# Patient Record
Sex: FEMALE | Race: WHITE | NOT HISPANIC OR LATINO | Employment: OTHER | URBAN - METROPOLITAN AREA
[De-identification: names, ages, dates, MRNs, and addresses within clinical notes are randomized per-mention and may not be internally consistent; named-entity substitution may affect disease eponyms.]

---

## 2022-08-29 ASSESSMENT — PATIENT HEALTH QUESTIONNAIRE - PHQ9
2. FEELING DOWN, DEPRESSED OR HOPELESS: MORE THAN HALF THE DAYS
10. IF YOU CHECKED OFF ANY PROBLEMS, HOW DIFFICULT HAVE THESE PROBLEMS MADE IT FOR YOU TO DO YOUR WORK, TAKE CARE OF THINGS AT HOME, OR GET ALONG WITH OTHER PEOPLE: VERY DIFFICULT
3. TROUBLE FALLING OR STAYING ASLEEP: MORE THAN HALF THE DAYS
SUM OF ALL RESPONSES TO PHQ9 QUESTIONS 1 & 2: 4
6. FEELING BAD ABOUT YOURSELF - OR THAT YOU ARE A FAILURE OR HAVE LET YOURSELF OR YOUR FAMILY DOWN: NEARLY EVERY DAY
7. TROUBLE CONCENTRATING ON THINGS, SUCH AS READING THE NEWSPAPER OR WATCHING TELEVISION: NOT AT ALL
1. LITTLE INTEREST OR PLEASURE IN DOING THINGS: MORE THAN HALF THE DAYS
8. MOVING OR SPEAKING SO SLOWLY THAT OTHER PEOPLE COULD HAVE NOTICED. OR THE OPPOSITE, BEING SO FIGETY OR RESTLESS THAT YOU HAVE BEEN MOVING AROUND A LOT MORE THAN USUAL: NOT AT ALL
4. FEELING TIRED OR HAVING LITTLE ENERGY: MORE THAN HALF THE DAYS
5. POOR APPETITE OR OVEREATING: NEARLY EVERY DAY
SUM OF ALL RESPONSES TO PHQ QUESTIONS 1-9: 14
9. THOUGHTS THAT YOU WOULD BE BETTER OFF DEAD, OR OF HURTING YOURSELF: NOT AT ALL

## 2022-08-29 ASSESSMENT — ANXIETY QUESTIONNAIRES
6. BECOMING EASILY ANNOYED OR IRRITABLE: NEARLY EVERY DAY
IF YOU CHECKED OFF ANY PROBLEMS ON THIS QUESTIONNAIRE, HOW DIFFICULT HAVE THESE PROBLEMS MADE IT FOR YOU TO DO YOUR WORK, TAKE CARE OF THINGS AT HOME, OR GET ALONG WITH OTHER PEOPLE: VERY DIFFICULT
3. WORRYING TOO MUCH ABOUT DIFFERENT THINGS: NEARLY EVERY DAY
7. FEELING AFRAID AS IF SOMETHING AWFUL MIGHT HAPPEN: NEARLY EVERY DAY
GAD7 TOTAL SCORE: 19
2. NOT BEING ABLE TO STOP OR CONTROL WORRYING: NEARLY EVERY DAY
5. BEING SO RESTLESS THAT IT IS HARD TO SIT STILL: SEVERAL DAYS
4. TROUBLE RELAXING: NEARLY EVERY DAY
1. FEELING NERVOUS, ANXIOUS, OR ON EDGE: NEARLY EVERY DAY

## 2023-02-08 ASSESSMENT — PATIENT HEALTH QUESTIONNAIRE - PHQ9
2. FEELING DOWN, DEPRESSED OR HOPELESS: SEVERAL DAYS
10. IF YOU CHECKED OFF ANY PROBLEMS, HOW DIFFICULT HAVE THESE PROBLEMS MADE IT FOR YOU TO DO YOUR WORK, TAKE CARE OF THINGS AT HOME, OR GET ALONG WITH OTHER PEOPLE: SOMEWHAT DIFFICULT
SUM OF ALL RESPONSES TO PHQ QUESTIONS 1-9: 4
SUM OF ALL RESPONSES TO PHQ9 QUESTIONS 1 & 2: 2
6. FEELING BAD ABOUT YOURSELF - OR THAT YOU ARE A FAILURE OR HAVE LET YOURSELF OR YOUR FAMILY DOWN: SEVERAL DAYS
3. TROUBLE FALLING OR STAYING ASLEEP: SEVERAL DAYS
5. POOR APPETITE OR OVEREATING: NOT AT ALL
4. FEELING TIRED OR HAVING LITTLE ENERGY: NOT AT ALL
1. LITTLE INTEREST OR PLEASURE IN DOING THINGS: SEVERAL DAYS
9. THOUGHTS THAT YOU WOULD BE BETTER OFF DEAD, OR OF HURTING YOURSELF: NOT AT ALL
8. MOVING OR SPEAKING SO SLOWLY THAT OTHER PEOPLE COULD HAVE NOTICED. OR THE OPPOSITE, BEING SO FIGETY OR RESTLESS THAT YOU HAVE BEEN MOVING AROUND A LOT MORE THAN USUAL: NOT AT ALL
7. TROUBLE CONCENTRATING ON THINGS, SUCH AS READING THE NEWSPAPER OR WATCHING TELEVISION: NOT AT ALL

## 2023-02-08 ASSESSMENT — ANXIETY QUESTIONNAIRES
IF YOU CHECKED OFF ANY PROBLEMS ON THIS QUESTIONNAIRE, HOW DIFFICULT HAVE THESE PROBLEMS MADE IT FOR YOU TO DO YOUR WORK, TAKE CARE OF THINGS AT HOME, OR GET ALONG WITH OTHER PEOPLE: SOMEWHAT DIFFICULT
3. WORRYING TOO MUCH ABOUT DIFFERENT THINGS: SEVERAL DAYS
2. NOT BEING ABLE TO STOP OR CONTROL WORRYING: SEVERAL DAYS
7. FEELING AFRAID AS IF SOMETHING AWFUL MIGHT HAPPEN: SEVERAL DAYS
1. FEELING NERVOUS, ANXIOUS, OR ON EDGE: SEVERAL DAYS
6. BECOMING EASILY ANNOYED OR IRRITABLE: SEVERAL DAYS
4. TROUBLE RELAXING: SEVERAL DAYS
5. BEING SO RESTLESS THAT IT IS HARD TO SIT STILL: NOT AT ALL
GAD7 TOTAL SCORE: 6

## 2023-02-27 PROBLEM — M89.9 SCAPULAR DYSFUNCTION: Status: ACTIVE | Noted: 2023-02-27

## 2023-02-27 PROBLEM — E55.9 VITAMIN D DEFICIENCY: Status: ACTIVE | Noted: 2023-02-27

## 2023-02-27 PROBLEM — G89.29 NECK PAIN, CHRONIC: Status: ACTIVE | Noted: 2023-02-27

## 2023-02-27 PROBLEM — M18.12 PRIMARY OSTEOARTHRITIS OF FIRST CARPOMETACARPAL JOINT OF LEFT HAND: Status: ACTIVE | Noted: 2023-02-27

## 2023-02-27 PROBLEM — G43.109 MIGRAINOUS VERTIGO: Status: ACTIVE | Noted: 2023-02-27

## 2023-02-27 PROBLEM — H90.41 SENSORINEURAL HEARING LOSS (SNHL) OF RIGHT EAR WITH UNRESTRICTED HEARING OF LEFT EAR: Status: ACTIVE | Noted: 2023-02-27

## 2023-02-27 PROBLEM — R51.9 LEFT-SIDED HEADACHE: Status: ACTIVE | Noted: 2023-02-27

## 2023-02-27 PROBLEM — G56.01 CARPAL TUNNEL SYNDROME OF RIGHT WRIST: Status: ACTIVE | Noted: 2023-02-27

## 2023-02-27 PROBLEM — E03.9 HYPOTHYROIDISM: Status: ACTIVE | Noted: 2023-02-27

## 2023-02-27 PROBLEM — L98.9 LESION OF SKIN OF FACE: Status: ACTIVE | Noted: 2023-02-27

## 2023-02-27 PROBLEM — E87.6 HYPOKALEMIA: Status: ACTIVE | Noted: 2023-02-27

## 2023-02-27 PROBLEM — K44.9 HERNIA, HIATAL: Status: ACTIVE | Noted: 2023-02-27

## 2023-02-27 PROBLEM — M54.50 LUMBAR PAIN: Status: ACTIVE | Noted: 2023-02-27

## 2023-02-27 PROBLEM — F41.1 GENERALIZED ANXIETY DISORDER: Status: ACTIVE | Noted: 2023-02-27

## 2023-02-27 PROBLEM — R19.7 DIARRHEA IN ADULT PATIENT: Status: ACTIVE | Noted: 2023-02-27

## 2023-02-27 PROBLEM — F07.81 CONCUSSION SYNDROME: Status: ACTIVE | Noted: 2023-02-27

## 2023-02-27 PROBLEM — M25.562 KNEE PAIN, LEFT: Status: ACTIVE | Noted: 2023-02-27

## 2023-02-27 PROBLEM — M79.642 BILATERAL HAND PAIN: Status: ACTIVE | Noted: 2023-02-27

## 2023-02-27 PROBLEM — E88.810 INSULIN RESISTANCE SYNDROME: Status: ACTIVE | Noted: 2023-02-27

## 2023-02-27 PROBLEM — G43.909 MIGRAINE HEADACHE: Status: ACTIVE | Noted: 2023-02-27

## 2023-02-27 PROBLEM — E67.3 HIGH VITAMIN D LEVEL: Status: ACTIVE | Noted: 2023-02-27

## 2023-02-27 PROBLEM — M79.641 BILATERAL HAND PAIN: Status: ACTIVE | Noted: 2023-02-27

## 2023-02-27 PROBLEM — S99.922A INJURY OF LEFT TOE: Status: ACTIVE | Noted: 2023-02-27

## 2023-02-27 PROBLEM — M54.2 NECK PAIN, CHRONIC: Status: ACTIVE | Noted: 2023-02-27

## 2023-02-27 PROBLEM — H93.13 TINNITUS OF BOTH EARS: Status: ACTIVE | Noted: 2023-02-27

## 2023-02-27 PROBLEM — G47.33 OSA (OBSTRUCTIVE SLEEP APNEA): Status: ACTIVE | Noted: 2023-02-27

## 2023-02-27 PROBLEM — E78.5 HYPERLIPIDEMIA: Status: ACTIVE | Noted: 2023-02-27

## 2023-02-27 PROBLEM — K59.09 CHRONIC CONSTIPATION: Status: ACTIVE | Noted: 2023-02-27

## 2023-02-27 PROBLEM — R42 VERTIGO: Status: ACTIVE | Noted: 2023-02-27

## 2023-02-27 PROBLEM — K21.9 GERD (GASTROESOPHAGEAL REFLUX DISEASE): Status: ACTIVE | Noted: 2023-02-27

## 2023-02-27 PROBLEM — E23.6 RATHKE'S CLEFT CYST (MULTI): Status: ACTIVE | Noted: 2023-02-27

## 2023-02-27 PROBLEM — R29.898 LEFT HAND WEAKNESS: Status: ACTIVE | Noted: 2023-02-27

## 2023-02-27 RX ORDER — ONDANSETRON 4 MG/1
8 TABLET, ORALLY DISINTEGRATING ORAL EVERY 8 HOURS PRN
COMMUNITY
Start: 2019-02-25

## 2023-02-27 RX ORDER — EZETIMIBE 10 MG/1
1 TABLET ORAL EVERY EVENING
COMMUNITY
Start: 2012-10-08 | End: 2023-10-16 | Stop reason: SDUPTHER

## 2023-02-27 RX ORDER — MECLIZINE HYDROCHLORIDE 25 MG/1
1 TABLET ORAL 3 TIMES DAILY PRN
COMMUNITY
Start: 2022-05-04

## 2023-02-27 RX ORDER — MOMETASONE FUROATE 50 UG/1
SPRAY, METERED NASAL
COMMUNITY
Start: 2021-11-11 | End: 2023-08-08 | Stop reason: ALTCHOICE

## 2023-02-27 RX ORDER — FAMOTIDINE 20 MG/1
20 TABLET, FILM COATED ORAL DAILY
COMMUNITY
End: 2023-08-08 | Stop reason: ALTCHOICE

## 2023-02-27 RX ORDER — HYDROCORTISONE 25 MG/G
CREAM TOPICAL
COMMUNITY
Start: 2019-12-03

## 2023-02-27 RX ORDER — IVERMECTIN 3 MG/1
5 TABLET ORAL DAILY PRN
COMMUNITY
End: 2024-02-13 | Stop reason: DRUGHIGH

## 2023-02-27 RX ORDER — FLUOXETINE 20 MG/1
2 TABLET ORAL DAILY
COMMUNITY
Start: 2022-09-06 | End: 2024-02-06 | Stop reason: SDUPTHER

## 2023-02-27 RX ORDER — SUMATRIPTAN 50 MG/1
50 TABLET, FILM COATED ORAL
COMMUNITY
Start: 2019-02-25

## 2023-02-27 RX ORDER — POLYETHYLENE GLYCOL 3350 17 G/17G
POWDER, FOR SOLUTION ORAL
COMMUNITY
End: 2023-08-08 | Stop reason: ALTCHOICE

## 2023-02-27 RX ORDER — LEVOTHYROXINE SODIUM 100 UG/1
1 TABLET ORAL DAILY
COMMUNITY
Start: 2011-12-21 | End: 2023-06-26 | Stop reason: SDUPTHER

## 2023-02-27 RX ORDER — POTASSIUM CHLORIDE 750 MG/1
1 TABLET, FILM COATED, EXTENDED RELEASE ORAL 3 TIMES DAILY
COMMUNITY
Start: 2016-05-31 | End: 2023-06-16 | Stop reason: SDUPTHER

## 2023-02-27 RX ORDER — ERGOCALCIFEROL 1.25 MG/1
1.25 CAPSULE ORAL
COMMUNITY
Start: 2016-07-14 | End: 2023-08-08 | Stop reason: SDUPTHER

## 2023-03-07 ENCOUNTER — SOCIAL WORK (OUTPATIENT)
Dept: PRIMARY CARE | Facility: CLINIC | Age: 63
End: 2023-03-07
Payer: COMMERCIAL

## 2023-03-09 ASSESSMENT — PATIENT HEALTH QUESTIONNAIRE - PHQ9
2. FEELING DOWN, DEPRESSED OR HOPELESS: SEVERAL DAYS
1. LITTLE INTEREST OR PLEASURE IN DOING THINGS: SEVERAL DAYS
SUM OF ALL RESPONSES TO PHQ9 QUESTIONS 1 & 2: 2
3. TROUBLE FALLING OR STAYING ASLEEP: SEVERAL DAYS
7. TROUBLE CONCENTRATING ON THINGS, SUCH AS READING THE NEWSPAPER OR WATCHING TELEVISION: NOT AT ALL
6. FEELING BAD ABOUT YOURSELF - OR THAT YOU ARE A FAILURE OR HAVE LET YOURSELF OR YOUR FAMILY DOWN: SEVERAL DAYS
4. FEELING TIRED OR HAVING LITTLE ENERGY: SEVERAL DAYS
SUM OF ALL RESPONSES TO PHQ QUESTIONS 1-9: 5
8. MOVING OR SPEAKING SO SLOWLY THAT OTHER PEOPLE COULD HAVE NOTICED. OR THE OPPOSITE, BEING SO FIGETY OR RESTLESS THAT YOU HAVE BEEN MOVING AROUND A LOT MORE THAN USUAL: NOT AT ALL
10. IF YOU CHECKED OFF ANY PROBLEMS, HOW DIFFICULT HAVE THESE PROBLEMS MADE IT FOR YOU TO DO YOUR WORK, TAKE CARE OF THINGS AT HOME, OR GET ALONG WITH OTHER PEOPLE: SOMEWHAT DIFFICULT
9. THOUGHTS THAT YOU WOULD BE BETTER OFF DEAD, OR OF HURTING YOURSELF: NOT AT ALL
5. POOR APPETITE OR OVEREATING: NOT AT ALL

## 2023-03-09 ASSESSMENT — ANXIETY QUESTIONNAIRES
2. NOT BEING ABLE TO STOP OR CONTROL WORRYING: SEVERAL DAYS
IF YOU CHECKED OFF ANY PROBLEMS ON THIS QUESTIONNAIRE, HOW DIFFICULT HAVE THESE PROBLEMS MADE IT FOR YOU TO DO YOUR WORK, TAKE CARE OF THINGS AT HOME, OR GET ALONG WITH OTHER PEOPLE: SOMEWHAT DIFFICULT
4. TROUBLE RELAXING: NOT AT ALL
3. WORRYING TOO MUCH ABOUT DIFFERENT THINGS: SEVERAL DAYS
7. FEELING AFRAID AS IF SOMETHING AWFUL MIGHT HAPPEN: NOT AT ALL
5. BEING SO RESTLESS THAT IT IS HARD TO SIT STILL: NOT AT ALL
6. BECOMING EASILY ANNOYED OR IRRITABLE: SEVERAL DAYS
GAD7 TOTAL SCORE: 3
1. FEELING NERVOUS, ANXIOUS, OR ON EDGE: NOT AT ALL

## 2023-03-29 ENCOUNTER — DOCUMENTATION (OUTPATIENT)
Dept: PRIMARY CARE | Facility: CLINIC | Age: 63
End: 2023-03-29
Payer: COMMERCIAL

## 2023-03-29 DIAGNOSIS — F41.1 GENERALIZED ANXIETY DISORDER: Primary | ICD-10-CM

## 2023-03-29 PROCEDURE — 99493 SBSQ PSYC COLLAB CARE MGMT: CPT | Performed by: INTERNAL MEDICINE

## 2023-04-04 ENCOUNTER — SOCIAL WORK (OUTPATIENT)
Dept: PRIMARY CARE | Facility: CLINIC | Age: 63
End: 2023-04-04
Payer: COMMERCIAL

## 2023-04-04 ASSESSMENT — PATIENT HEALTH QUESTIONNAIRE - PHQ9
7. TROUBLE CONCENTRATING ON THINGS, SUCH AS READING THE NEWSPAPER OR WATCHING TELEVISION: NOT AT ALL
1. LITTLE INTEREST OR PLEASURE IN DOING THINGS: NOT AT ALL
2. FEELING DOWN, DEPRESSED OR HOPELESS: SEVERAL DAYS
3. TROUBLE FALLING OR STAYING ASLEEP: SEVERAL DAYS
SUM OF ALL RESPONSES TO PHQ QUESTIONS 1-9: 3
5. POOR APPETITE OR OVEREATING: NOT AT ALL
4. FEELING TIRED OR HAVING LITTLE ENERGY: NOT AT ALL
9. THOUGHTS THAT YOU WOULD BE BETTER OFF DEAD, OR OF HURTING YOURSELF: NOT AT ALL
6. FEELING BAD ABOUT YOURSELF - OR THAT YOU ARE A FAILURE OR HAVE LET YOURSELF OR YOUR FAMILY DOWN: SEVERAL DAYS
8. MOVING OR SPEAKING SO SLOWLY THAT OTHER PEOPLE COULD HAVE NOTICED. OR THE OPPOSITE, BEING SO FIGETY OR RESTLESS THAT YOU HAVE BEEN MOVING AROUND A LOT MORE THAN USUAL: NOT AT ALL
SUM OF ALL RESPONSES TO PHQ9 QUESTIONS 1 & 2: 1
10. IF YOU CHECKED OFF ANY PROBLEMS, HOW DIFFICULT HAVE THESE PROBLEMS MADE IT FOR YOU TO DO YOUR WORK, TAKE CARE OF THINGS AT HOME, OR GET ALONG WITH OTHER PEOPLE: SOMEWHAT DIFFICULT

## 2023-04-04 ASSESSMENT — ANXIETY QUESTIONNAIRES
2. NOT BEING ABLE TO STOP OR CONTROL WORRYING: SEVERAL DAYS
GAD7 TOTAL SCORE: 1
4. TROUBLE RELAXING: NOT AT ALL
6. BECOMING EASILY ANNOYED OR IRRITABLE: NOT AT ALL
1. FEELING NERVOUS, ANXIOUS, OR ON EDGE: NOT AT ALL
3. WORRYING TOO MUCH ABOUT DIFFERENT THINGS: NOT AT ALL
IF YOU CHECKED OFF ANY PROBLEMS ON THIS QUESTIONNAIRE, HOW DIFFICULT HAVE THESE PROBLEMS MADE IT FOR YOU TO DO YOUR WORK, TAKE CARE OF THINGS AT HOME, OR GET ALONG WITH OTHER PEOPLE: SOMEWHAT DIFFICULT
7. FEELING AFRAID AS IF SOMETHING AWFUL MIGHT HAPPEN: NOT AT ALL
5. BEING SO RESTLESS THAT IT IS HARD TO SIT STILL: NOT AT ALL

## 2023-04-04 NOTE — PROGRESS NOTES
"Collaborative Care (CoCM)  Progress Note    Type of Interaction: In Office    Start Time: 10:10 AM    End Time: 11:10 AM        Appointment: Scheduled    Reason for Visit:   Chief Complaint   Patient presents with    Anxiety         Interval History / Patient Symptoms:     Patient Health Questionnaire-9 Score: 3 (4/4/2023 12:13 PM)  KRISH-7 Total Score: 1 (4/4/2023 12:14 PM)    [facility] met with pt for scheduled office visit. Pt reported overall she has been doing much better. Pt reported she is able to recognize her triggers for anger and anxiety and is better able to cope with these emotions in the moment and \"let certain things go that don't matter.\" Pt reported she has enforced boundaries with her partner which has helped their relationship. Pt stated she has had difficulty staying asleep within the last few weeks but will get up out of bed to go read to help her relax. Pt reported she is typically able to fall back to sleep afterwards.     Interventions Provided: [place] Setting and Develop Coping Strategies      Progress Made: Moderate    Response to Intervention: Pt was engaged throughout session and reported she has been more intentional about setting time aside for self care. Pt reported she has been utilizing mindfulness/ grounding techniques learned in session in her daily routine. Pt stated she has been asking for more help from her family members to care for her mother which has helped alleviate caregiver stress. Pt was receptive towards [facility] feedback and interventions provided.        Plan:     Care Plan        Active        Anxiety       Start:  03/09/23          STG: Patient will attend at least 80% of scheduled [facility] sessions       Start:  03/09/23    Expected End:  09/07/23             STG: Alize will complete at least 80% of assigned homework        Start:  03/09/23    Expected End:  09/07/23             STG: Alize will practice recommended interventions outside of counseling sessions       Start:  " 03/09/23    Expected End:  09/07/23                   Resolved       There are no resolved problems.                           There are no Patient Instructions on file for this visit.      Follow Up / Next Appointment: Next appointment: 04/25/23

## 2023-04-25 ENCOUNTER — SOCIAL WORK (OUTPATIENT)
Dept: PRIMARY CARE | Facility: CLINIC | Age: 63
End: 2023-04-25
Payer: COMMERCIAL

## 2023-04-25 ASSESSMENT — PATIENT HEALTH QUESTIONNAIRE - PHQ9
5. POOR APPETITE OR OVEREATING: NOT AT ALL
3. TROUBLE FALLING OR STAYING ASLEEP: SEVERAL DAYS
7. TROUBLE CONCENTRATING ON THINGS, SUCH AS READING THE NEWSPAPER OR WATCHING TELEVISION: NOT AT ALL
10. IF YOU CHECKED OFF ANY PROBLEMS, HOW DIFFICULT HAVE THESE PROBLEMS MADE IT FOR YOU TO DO YOUR WORK, TAKE CARE OF THINGS AT HOME, OR GET ALONG WITH OTHER PEOPLE: SOMEWHAT DIFFICULT
SUM OF ALL RESPONSES TO PHQ9 QUESTIONS 1 & 2: 1
4. FEELING TIRED OR HAVING LITTLE ENERGY: NOT AT ALL
6. FEELING BAD ABOUT YOURSELF - OR THAT YOU ARE A FAILURE OR HAVE LET YOURSELF OR YOUR FAMILY DOWN: SEVERAL DAYS
1. LITTLE INTEREST OR PLEASURE IN DOING THINGS: NOT AT ALL
SUM OF ALL RESPONSES TO PHQ QUESTIONS 1-9: 3
8. MOVING OR SPEAKING SO SLOWLY THAT OTHER PEOPLE COULD HAVE NOTICED. OR THE OPPOSITE, BEING SO FIGETY OR RESTLESS THAT YOU HAVE BEEN MOVING AROUND A LOT MORE THAN USUAL: NOT AT ALL
9. THOUGHTS THAT YOU WOULD BE BETTER OFF DEAD, OR OF HURTING YOURSELF: NOT AT ALL
2. FEELING DOWN, DEPRESSED OR HOPELESS: SEVERAL DAYS

## 2023-04-25 ASSESSMENT — ANXIETY QUESTIONNAIRES
1. FEELING NERVOUS, ANXIOUS, OR ON EDGE: SEVERAL DAYS
5. BEING SO RESTLESS THAT IT IS HARD TO SIT STILL: NOT AT ALL
6. BECOMING EASILY ANNOYED OR IRRITABLE: SEVERAL DAYS
2. NOT BEING ABLE TO STOP OR CONTROL WORRYING: SEVERAL DAYS
GAD7 TOTAL SCORE: 5
3. WORRYING TOO MUCH ABOUT DIFFERENT THINGS: SEVERAL DAYS
7. FEELING AFRAID AS IF SOMETHING AWFUL MIGHT HAPPEN: NOT AT ALL
4. TROUBLE RELAXING: SEVERAL DAYS
IF YOU CHECKED OFF ANY PROBLEMS ON THIS QUESTIONNAIRE, HOW DIFFICULT HAVE THESE PROBLEMS MADE IT FOR YOU TO DO YOUR WORK, TAKE CARE OF THINGS AT HOME, OR GET ALONG WITH OTHER PEOPLE: SOMEWHAT DIFFICULT

## 2023-04-25 NOTE — PROGRESS NOTES
Collaborative Care (CoCM)  Progress Note    Type of Interaction: In Office    Start Time: 10:02 AM    End Time: 11:05 AM        Appointment: Scheduled    Reason for Visit:   Chief Complaint   Patient presents with    Anxiety         Interval History / Patient Symptoms:     Patient Health Questionnaire-9 Score: 3 (4/25/2023 11:21 AM)  KRISH-7 Total Score: 5 (4/25/2023 11:22 AM)    M met with pt for scheduled office visit. Pt reported she had a difficult week with balancing caring for her mother, partner's mother, and conflict with partner. Pt reported she feels she is better able to manage stress and anger. Pt reported she has been utilizing mindfulness to manage difficult emotions so that she is not so reactive. Pt processed triggers that tend to lead to mood irritability such as being overwhelmed, driving/ traffic, and feeling disrespected. Pt reported she finds she is stuck in the past and angry about a situation that occurred last year. Pt reported she feels if she were able to let these things go and observe what these thoughts/ emotions were trying to tell her, she would be able to remain present and nourish relationships in her life.     Interventions Provided: Acceptance & Commitment Therapy and Mindfulness      Progress Made: Moderate    Response to Intervention: Pt was engaged throughout session and was receptive towards [facility] feedback and interventions provided. Pt reported mindfulness exercise was helpful and that she felt more calm and grounded. Pt stated she will utilize coping skills during upcoming softball tournament because it may trigger feelings of anger and anxiety.         Plan:     Care Plan        Active        Anxiety       Start:  03/09/23          STG: Patient will attend at least 80% of scheduled [facility] sessions       Start:  03/09/23    Expected End:  09/07/23             STG: Alize will complete at least 80% of assigned homework        Start:  03/09/23    Expected End:  09/07/23              STG: Alize will practice recommended interventions outside of counseling sessions       Start:  03/09/23    Expected End:  09/07/23                   Resolved       There are no resolved problems.                           There are no Patient Instructions on file for this visit.      Follow Up / Next Appointment: Next appointment: 05/31/23

## 2023-04-28 ENCOUNTER — DOCUMENTATION (OUTPATIENT)
Dept: PRIMARY CARE | Facility: CLINIC | Age: 63
End: 2023-04-28
Payer: COMMERCIAL

## 2023-04-28 DIAGNOSIS — F41.1 GENERALIZED ANXIETY DISORDER: Primary | ICD-10-CM

## 2023-04-28 PROCEDURE — 99493 SBSQ PSYC COLLAB CARE MGMT: CPT | Performed by: INTERNAL MEDICINE

## 2023-04-28 PROCEDURE — 99494 1ST/SBSQ PSYC COLLAB CARE: CPT | Performed by: INTERNAL MEDICINE

## 2023-05-31 ENCOUNTER — SOCIAL WORK (OUTPATIENT)
Dept: PRIMARY CARE | Facility: CLINIC | Age: 63
End: 2023-05-31
Payer: COMMERCIAL

## 2023-05-31 ASSESSMENT — ANXIETY QUESTIONNAIRES
1. FEELING NERVOUS, ANXIOUS, OR ON EDGE: SEVERAL DAYS
6. BECOMING EASILY ANNOYED OR IRRITABLE: SEVERAL DAYS
7. FEELING AFRAID AS IF SOMETHING AWFUL MIGHT HAPPEN: NOT AT ALL
4. TROUBLE RELAXING: SEVERAL DAYS
3. WORRYING TOO MUCH ABOUT DIFFERENT THINGS: SEVERAL DAYS
GAD7 TOTAL SCORE: 5
2. NOT BEING ABLE TO STOP OR CONTROL WORRYING: SEVERAL DAYS
5. BEING SO RESTLESS THAT IT IS HARD TO SIT STILL: NOT AT ALL
IF YOU CHECKED OFF ANY PROBLEMS ON THIS QUESTIONNAIRE, HOW DIFFICULT HAVE THESE PROBLEMS MADE IT FOR YOU TO DO YOUR WORK, TAKE CARE OF THINGS AT HOME, OR GET ALONG WITH OTHER PEOPLE: SOMEWHAT DIFFICULT

## 2023-05-31 ASSESSMENT — PATIENT HEALTH QUESTIONNAIRE - PHQ9
7. TROUBLE CONCENTRATING ON THINGS, SUCH AS READING THE NEWSPAPER OR WATCHING TELEVISION: NOT AT ALL
SUM OF ALL RESPONSES TO PHQ9 QUESTIONS 1 & 2: 2
6. FEELING BAD ABOUT YOURSELF - OR THAT YOU ARE A FAILURE OR HAVE LET YOURSELF OR YOUR FAMILY DOWN: SEVERAL DAYS
9. THOUGHTS THAT YOU WOULD BE BETTER OFF DEAD, OR OF HURTING YOURSELF: NOT AT ALL
10. IF YOU CHECKED OFF ANY PROBLEMS, HOW DIFFICULT HAVE THESE PROBLEMS MADE IT FOR YOU TO DO YOUR WORK, TAKE CARE OF THINGS AT HOME, OR GET ALONG WITH OTHER PEOPLE: SOMEWHAT DIFFICULT
4. FEELING TIRED OR HAVING LITTLE ENERGY: SEVERAL DAYS
3. TROUBLE FALLING OR STAYING ASLEEP: NOT AT ALL
8. MOVING OR SPEAKING SO SLOWLY THAT OTHER PEOPLE COULD HAVE NOTICED. OR THE OPPOSITE, BEING SO FIGETY OR RESTLESS THAT YOU HAVE BEEN MOVING AROUND A LOT MORE THAN USUAL: NOT AT ALL
2. FEELING DOWN, DEPRESSED OR HOPELESS: SEVERAL DAYS
5. POOR APPETITE OR OVEREATING: NOT AT ALL
1. LITTLE INTEREST OR PLEASURE IN DOING THINGS: SEVERAL DAYS
SUM OF ALL RESPONSES TO PHQ QUESTIONS 1-9: 4

## 2023-05-31 NOTE — PROGRESS NOTES
Collaborative Care (CoCM)  Progress Note    Type of Interaction: In Office    Start Time: 10:10 AM    End Time: 11:10 AM        Appointment: Scheduled    Reason for Visit:   Chief Complaint   Patient presents with    Anxiety         Interval History / Patient Symptoms:     Patient Health Questionnaire-9 Score: 4 (5/31/2023 12:52 PM)  KRISH-7 Total Score: 5 (5/31/2023 12:52 PM)    [facility] met with pt for scheduled office visit. Pt stated although she is starting to feel more like herself, there are multiple stressors that are impacting her. Pt discussed difficult emotions and thoughts that occurred during recent softball tournament. Pt reported she removed herself from the situation because she values the friends that she has on the team. Pt reported she is considering either quitting softball or switching teams. BHM and pt discussed values vs. Goals and pt processed the kind of life she wants to live/ person she wants to be. [facility] introduced pt to mindfulness technique for periods of intense anger/ anxiety.     Interventions Provided: [place] Setting and Acceptance & Commitment Therapy      Progress Made: Significant    Response to Intervention: Pt was engaged throughout session and was receptive towards [facility] feedback and interventions provided.        There are no Patient Instructions on file for this visit.      Follow Up / Next Appointment: Next appointment: 07/12/23

## 2023-06-01 ENCOUNTER — DOCUMENTATION (OUTPATIENT)
Dept: PRIMARY CARE | Facility: CLINIC | Age: 63
End: 2023-06-01
Payer: COMMERCIAL

## 2023-06-01 DIAGNOSIS — F41.1 GENERALIZED ANXIETY DISORDER: Primary | ICD-10-CM

## 2023-06-01 PROCEDURE — 99493 SBSQ PSYC COLLAB CARE MGMT: CPT | Performed by: INTERNAL MEDICINE

## 2023-06-16 DIAGNOSIS — E87.6 HYPOKALEMIA: Primary | ICD-10-CM

## 2023-06-16 RX ORDER — POTASSIUM CHLORIDE 750 MG/1
10 TABLET, FILM COATED, EXTENDED RELEASE ORAL 3 TIMES DAILY
Qty: 90 TABLET | Refills: 3 | Status: SHIPPED | OUTPATIENT
Start: 2023-06-16 | End: 2024-02-13 | Stop reason: DRUGHIGH

## 2023-06-26 DIAGNOSIS — E03.9 HYPOTHYROIDISM, UNSPECIFIED TYPE: ICD-10-CM

## 2023-06-26 RX ORDER — LEVOTHYROXINE SODIUM 100 UG/1
100 TABLET ORAL DAILY
Qty: 90 TABLET | Refills: 3 | Status: SHIPPED | OUTPATIENT
Start: 2023-06-26 | End: 2023-07-01

## 2023-07-01 DIAGNOSIS — E03.9 HYPOTHYROIDISM, UNSPECIFIED TYPE: ICD-10-CM

## 2023-07-01 RX ORDER — LEVOTHYROXINE SODIUM 100 UG/1
TABLET ORAL
Qty: 90 TABLET | Refills: 3 | Status: SHIPPED | OUTPATIENT
Start: 2023-07-01

## 2023-07-12 ENCOUNTER — DOCUMENTATION (OUTPATIENT)
Dept: PRIMARY CARE | Facility: CLINIC | Age: 63
End: 2023-07-12

## 2023-07-12 ENCOUNTER — SOCIAL WORK (OUTPATIENT)
Dept: PRIMARY CARE | Facility: CLINIC | Age: 63
End: 2023-07-12
Payer: MEDICARE

## 2023-07-12 DIAGNOSIS — F41.1 GENERALIZED ANXIETY DISORDER: Primary | ICD-10-CM

## 2023-07-12 PROCEDURE — 99493 SBSQ PSYC COLLAB CARE MGMT: CPT | Performed by: INTERNAL MEDICINE

## 2023-07-12 NOTE — PROGRESS NOTES
"Collaborative Care (CoCM)  Progress Note    Type of Interaction: In Office    Start Time: 10:05 AM    End Time: 11:00 AM      Appointment: Scheduled    Reason for Visit:   Chief Complaint   Patient presents with    Anxiety       Interval History / Patient Symptoms:     M met with pt for scheduled office visit. Pt reported she has had some stressful situations occur, but feels overall that she is doing well with managing symptoms of anxiety. Pt reported she has been utilizing mindfulness techniques learned in session in her daily routine and has been riding her bike and eating healthily. Pt reported she has learned to accept things that she does not have control over. Pt reported she has been prioritizing her own self care and utilizing assertive communication to enforce boundaries with others. Pt reported she feels she is in an overall good place. [facility] discussed DBT coping skill entitled \"Urge Surfing\" with pt to help her manage challenging situations in the moment and cope with mood irritability.     Interventions Provided: Develop Coping Strategies and Review Progress/Goals Stress Management      Progress Made: Significant    Response to Intervention: Pt was engaged throughout session and was receptive towards [facility] feedback and interventions provided. Pt treatment status will be transitioned to graduated due to pt improved mental health symptoms of over 50% as evidenced by pt report and KRISH-7 and PHQ-9 screening scores. Pt was instructed to contact her PCP if symptoms return.       Plan:     Care Plan        Active        Anxiety       Start:  03/09/23          STG: Patient will attend at least 80% of scheduled [facility] sessions       Start:  03/09/23    Expected End:  09/07/23             STG: Alize will complete at least 80% of assigned homework        Start:  03/09/23    Expected End:  09/07/23             STG: Alize will practice recommended interventions outside of counseling sessions       Start:  03/09/23    " Expected End:  09/07/23                   Resolved       There are no resolved problems.                           There are no Patient Instructions on file for this visit.      Follow Up / Next Appointment: N/A

## 2023-08-07 NOTE — PROGRESS NOTES
Subjective   Reason for Visit: Alize Castano is an 63 y.o. female here for her Welcome to Medicare Assessment           She sprained her right little toe last Thursday 8/3/2023  She has been leonel taping the toe    She tied OTC GERD medications but it is not helping that much     She has intermittent tinnitus     She is riding her e- bike for exercise     HEALTH:  PAP 2013 and Q 5, 2/18 and no longer need to repeat   Mammo 10/18, 9/2020, 11/2021, 11/2022, ordered 8/2023  BD 9/12, 4/19 T-0.9, ordered 8/2023  Colon 2008 , 7/2015 with Dr Dixon and 6/2021 and Q 7, will repeat in 2028  EGD Dr Watson 8/16 -  Ca cardiac score 6/2012 was ZERO and ordered 8/2023  EKG 5/13, 12/15, 1/18, 6/2020, 6/2021, 2/2023 ED   U/A 12/14, 12/15 , 1/17, 2/18, 3/19, 6/2020, 8/2021   Hep C negative 11/2021   FLU 9/16 , 9/17, 10/18, 9/19, 9/2020, 10/2021, 11/2022   TDAP 2008 , 5/19  Zostavax 5/15   Prevnar 20 recommended but wants to wait 8/2023  Pneumovax never   Shingrix 6/23/2020 and 9/22/2020  Pfizer CVD vaccine 3/6/2021 and 3/27/2021   Ophth She wears glasses. No glaucoma or MD. She has the beginnings of cataracts that is being observed for now.       Patient Care Team:  Jennifer Sánchez MD as PCP - General  Jennifer Sánchez MD as PCP - [facility] P[facility]     Review of Systems  All systems negative except those listed in the HPI      Past Medical, Surgical, and Family History reviewed and updated in chart.  Reviewed all medications by prescribing practitioner or clinical pharmacist   (such as prescriptions, OTCs, herbal therapies and supplements) and documented in the medical record      Objective   Vitals:  Visit Vitals  /82   Pulse 63   Temp 36.2 °C (97.1 °F) (Temporal)    Body mass index is 27.1 kg/m².      Physical Exam  Vitals reviewed.   Constitutional:       Appearance: Normal appearance.   HENT:      Head: Normocephalic.      Right Ear: Tympanic membrane, ear canal and external ear normal.      Left Ear: Tympanic membrane,  ear canal and external ear normal.      Nose: Nose normal.      Mouth/Throat:      Pharynx: Oropharynx is clear.   Eyes:      Conjunctiva/sclera: Conjunctivae normal.   Cardiovascular:      Rate and Rhythm: Normal rate and regular rhythm.      Pulses: Normal pulses.      Heart sounds: Normal heart sounds.   Pulmonary:      Effort: Pulmonary effort is normal.      Breath sounds: Normal breath sounds.   Chest:      Comments: Breast exam normal except scars present from breast reduction surgery in the past   Abdominal:      General: Bowel sounds are normal.      Palpations: Abdomen is soft.   Musculoskeletal:         General: Normal range of motion.      Cervical back: Normal range of motion and neck supple.   Skin:     General: Skin is warm.   Neurological:      General: No focal deficit present.      Mental Status: She is alert and oriented to person, place, and time.   Psychiatric:         Mood and Affect: Mood normal.         Behavior: Behavior normal.         Thought Content: Thought content normal.         Judgment: Judgment normal.       Assessment/Plan   Problem List Items Addressed This Visit       Hyperlipidemia    Relevant Orders    CT cardiac scoring wo IV contrast     Other Visit Diagnoses       Visit for screening mammogram    -  Primary    Relevant Orders    BI mammo bilateral screening tomosynthesis    Asymptomatic menopausal state        Relevant Orders    XR DEXA bone density          Welcome to Medicare Assessment completed   Reviewed her labs from 2/2023      Medicare Wellness completed  -  Discussed healthy diet and regular exercise.    -  Physical exam overall unremarkable. Immunizations reviewed and updated accordingly. Healthy lifestyle choices discussed (tobacco avoidance, appropriate alcohol use, avoidance of illicit substances).   -  Patient is wearing seatbelt.   -  Screening lab work ordered as indicated.    -  Age appropriate screening tests reviewed with patient.        We spent 15  minutes discussing depression screen and there is nothing found that is of concern for underling depression. The PQH form was filled and the meds reviewed.  No depression to report     We spent 15 minutes discussing alcohol use and there are no concerns about overuse. The 15 min was spent in going over any issues of use of alcohol. 1-2 glasses of wine 3-4 times a week     She has grab bars in the shower.  She has not fallen recently and no risk of falls in the house   She has good lighting around the house and functioning smoke detectors.     She sprained her right little toe last Thursday 8/3/2023: right 5th toe is healing well and well aligned 8/2023  She has been leonel taping the toe  Recommend she continue to leonel tape the toe for 4- 6 weeks   Recommend she apply Arnica cream to the toe daily     She saw Dr Mcdonald on 8/23/18 for hearing evaluation   Ruled out Menieres disease. She has intermittent tinnitus   Continue vitamin B2 2-3 times a week and Rx given for Antivert.   She saw Dr Ozuna on 9/24/19 and will follow in 1 year for repeat audiogram   She has sensorineural hearing loss of the right ear with unrestricted hearing of left ear.   Recommend she follow with Evonne Ozuna CNP and annual audiological evaluation    Her weight in office today is 173 with BMI of 27.10. We spent 15 minutes discussing diet and weight loss. The struggle of weight loss persists   Explained goal for BMI to be 25 or less     HTN- Stable   She stopped Triamterene/HCTZ 37.5/25 mg.   She can stay off the diuretic for now.   EKG 8/2023 was normal, no LVH or strain pattern noted.   ECHO 2/2023 - LV systolic function normal with EF 65 -70%, no regional wall motion abnormality  Stress test 4/2023 no evidence for inducible ischemia or previous myocardial scarring, with normal left and right ventricular size and systolic function and preserved EF  She saw Dr Delarosa in 4/2023 and only needs to follow prn     Recommend she monitor her BP at  home and call with elevated readings   She is seeing Dr Delarosa at      I have spent 15 min face to face with this patient discussing their cardiac risk and behavioral therapies of nutrition choices and exercise. We are trying to eliminate habits that are contributing to their cardiac risk.  We agreed on a plan of how they can reduce their current CV risk   The patient's  10 yr CV risk was estimated at  5.2 % 8/2023    Elevated Lipids:  and HDL 70 on labs in 2/2023  Explained goal for LDL to be less than 100 and ideally less than 70   She failed Lipitor due to muscle pain.   Continue ezetimibe 10 mg daily   Ca cardiac score 6/2012 was ZERO and ordered 8/2023   Discussed diet and exercise for lipid control     Hypothyroid:   Continue BRANDED Synthroid 100 mcg daily.     Hypokalemia: levels stable on labs in 1/2023   Continue potassium chloride 10 MEQ 3 days a week     Migrainous vertigo:   Continue sumatriptan 50 mg 2 tablets prn   She has Zofran prn nausea and Meclizine prn vertigo.   She is using Nasonex daily and continue Mg 250 mg 2 tablets at bedtime   She could not tolerate riboflavin.   MRI of brain 9/26/18 nodular lesion along the superior margin of the pituitary gland extending cranially into the suprasellar region along the right ventral margin measuring approx 9 mm x 5 mm in transaxial dimensions, compared this to MRI done 5/21/2009 and appeared to be unchanged.   Continue acupuncture and massage therapy for migraines   She has a Medrol Dose Jovon on hand to use if she can not break the migraine   She saw Dr Matthews in 7/2023    Forgetfulness and feeling distracted : Her stress levels contribute to this  I feel this is medication related vs age related   MRI of the brain on 6/2021 stable compared to previous studies   Nodule is stable near pituitary area and not affecting anything     GERD: worsened with weight gain.   Prescription sent in for Nexium 40 mg daily, possible side effects discussed with  medication   EGD was normal in 8/16 and so nothing needed to be changed   She saw Dr Dixon in 5/2021 and colonoscopy 6/17/2021 normal and Q 7     Anxiety and depression:   She has increased stressors taking care of her mother   Her mom is 91 y/o   Her dad passed away in 2/17 from lymphoma and mom still lives alone.  She has retired from her job 8/1/18 and is traveling more   We discontinued Pristiq   Continue fluoxetine 40 mg daily. Warned patient of food satiation with SSRI's    IBS- C:   I would like her to stop Benefiber and use her food intake to get fiber   She did not start the FODMAP diet because she found it to be too restrictive.  Colonoscopy 6/17/2021 normal and Q 7   She saw Dr Dixon in 5/2021     Right shoulder and neck pain:  She completed PT with good results   PT helped with all the HA and vertigo issues as well   She saw ortho and they feel she is where she needs to be and no recommendations for surgery.    Left knee pain:   There was nothing found on exam with Dr Cuevas at 5/30/19 appt   Xrays of left knee were normal     Right carpal tunnel:  Continue wearing the brace at night   She would like to hold off of surgery for now   Xrays 3/2021 degenerative changes, most severely at the carpometacarpal joint   She saw Dr Akbar in 3/2021     Sleep apnea Dx in 9/17 and on CPAP at -12 setting with DR Martini.   She has been faithfully using her CPAP nightly.   She saw the sleep specialist in 5/2021   Needs to follow up since she lost weight, thinks her dosage is too high and is effecting her sleep    Eczema and itchy rash during winter months  Continue mometasone cream prn and this seems to help   She sees derm yearly     Vitamin D deficiency-   She feels more comfortable when her levels are above 50  Continue scripted Vitamin D 50 K UT weekly. Refilled 8/2023     PAP normal 2018 and this was her last one   She had an ovarian cyst and saw Dr Carr   Pelvic U/S 9/2013 and the right cyst is resolved  and no other abnormalities   Mammo + regino 11/2022 was normal and ordered 8/2023  Breast exam normal except scars present from breast reduction surgery in the past 8/2023    BD in 4/19 was T -0.9. Continue 1 ES Tums daily and scripted Vitamin D weekly and eat 2 servings of calcium enriched foods daily.   Discussed importance of weight bearing exercises   Colonoscopy 6/2021 and Q 7 with Dr Dixon.     Ophth:   She wears glasses. No glaucoma or MD. She has the beginnings of cataracts that is being observed for now.   She will have her next eye exam faxed to my office in order to update her medical records.    I spent 15 min with the patient discussing their wishes for end of life choices.   We discussed the need for a Living Will and that wishes should be discussed with Family. The DNR status was reviewed, and we discussed the options of this and, the DNR _CC options as well.   We also went over how important it was to have these choices written down and clear for any surviving family so that their wishes are followed   The patient and I came to to following agreement : She has a living will and MPOA. Recommend she bring a copy of her Advanced Directives in office to have scanned in her chart 8/2023    Hep C negative 11/2021   FLU 9/16 , 9/17, 10/18, 9/19, 9/2020, 10/2021, 11/2022   TDAP 2008 , 5/19  Zostavax 5/15   Prevnar 20 recommended but wants to wait 8/2023  Pneumovax never   Shingrix 6/23/2020 and 9/22/2020  Pfizer CVD vaccine 3/6/2021 and 3/27/2021 - recommend getting the booster     Some elements in the chart were copied from Dr. Sánchez's last office visit with patient. Notes have been updated where appropriate, and reflect my current medical decision making from today.     RTC in 6 months for follow up or sooner if needed   (CR due 8/2024)     Scribe Attestation  By signing my name below, I, Teri Galvan , Scribe   attest that this documentation has been prepared under the direction and in the presence of  Jennifer Sánchez MD.

## 2023-08-08 ENCOUNTER — OFFICE VISIT (OUTPATIENT)
Dept: PRIMARY CARE | Facility: CLINIC | Age: 63
End: 2023-08-08
Payer: MEDICARE

## 2023-08-08 VITALS
HEART RATE: 63 BPM | TEMPERATURE: 97.1 F | SYSTOLIC BLOOD PRESSURE: 128 MMHG | OXYGEN SATURATION: 97 % | BODY MASS INDEX: 27.15 KG/M2 | DIASTOLIC BLOOD PRESSURE: 82 MMHG | HEIGHT: 67 IN | WEIGHT: 173 LBS

## 2023-08-08 DIAGNOSIS — Z78.0 ASYMPTOMATIC MENOPAUSAL STATE: ICD-10-CM

## 2023-08-08 DIAGNOSIS — R21 RASH: ICD-10-CM

## 2023-08-08 DIAGNOSIS — G43.119 INTRACTABLE MIGRAINE WITH AURA WITHOUT STATUS MIGRAINOSUS: ICD-10-CM

## 2023-08-08 DIAGNOSIS — E55.9 VITAMIN D DEFICIENCY: ICD-10-CM

## 2023-08-08 DIAGNOSIS — E78.5 HYPERLIPIDEMIA, UNSPECIFIED HYPERLIPIDEMIA TYPE: ICD-10-CM

## 2023-08-08 DIAGNOSIS — Z00.00 HEALTHCARE MAINTENANCE: Primary | ICD-10-CM

## 2023-08-08 DIAGNOSIS — G47.33 OSA (OBSTRUCTIVE SLEEP APNEA): ICD-10-CM

## 2023-08-08 DIAGNOSIS — Z12.31 VISIT FOR SCREENING MAMMOGRAM: ICD-10-CM

## 2023-08-08 DIAGNOSIS — E23.6 RATHKE'S CLEFT CYST (MULTI): ICD-10-CM

## 2023-08-08 DIAGNOSIS — K21.00 GASTROESOPHAGEAL REFLUX DISEASE WITH ESOPHAGITIS WITHOUT HEMORRHAGE: ICD-10-CM

## 2023-08-08 DIAGNOSIS — S99.922D INJURY OF TOE ON LEFT FOOT, SUBSEQUENT ENCOUNTER: ICD-10-CM

## 2023-08-08 PROBLEM — R51.9 LEFT-SIDED HEADACHE: Status: RESOLVED | Noted: 2023-02-27 | Resolved: 2023-08-08

## 2023-08-08 PROBLEM — M25.562 KNEE PAIN, LEFT: Status: RESOLVED | Noted: 2023-02-27 | Resolved: 2023-08-08

## 2023-08-08 PROBLEM — R53.83 FATIGUE: Status: ACTIVE | Noted: 2023-08-08

## 2023-08-08 PROBLEM — M75.41 IMPINGEMENT SYNDROME OF RIGHT SHOULDER: Status: ACTIVE | Noted: 2018-10-16

## 2023-08-08 PROBLEM — F07.81 CONCUSSION SYNDROME: Status: RESOLVED | Noted: 2023-02-27 | Resolved: 2023-08-08

## 2023-08-08 PROCEDURE — G0402 INITIAL PREVENTIVE EXAM: HCPCS | Performed by: INTERNAL MEDICINE

## 2023-08-08 PROCEDURE — G0403 EKG FOR INITIAL PREVENT EXAM: HCPCS | Performed by: INTERNAL MEDICINE

## 2023-08-08 PROCEDURE — 1159F MED LIST DOCD IN RCRD: CPT | Performed by: INTERNAL MEDICINE

## 2023-08-08 PROCEDURE — 1170F FXNL STATUS ASSESSED: CPT | Performed by: INTERNAL MEDICINE

## 2023-08-08 PROCEDURE — 1036F TOBACCO NON-USER: CPT | Performed by: INTERNAL MEDICINE

## 2023-08-08 RX ORDER — LANOLIN ALCOHOL/MO/W.PET/CERES
1000 CREAM (GRAM) TOPICAL DAILY
COMMUNITY

## 2023-08-08 RX ORDER — ERGOCALCIFEROL 1.25 MG/1
1.25 CAPSULE ORAL
Qty: 12 CAPSULE | Refills: 3 | Status: SHIPPED | OUTPATIENT
Start: 2023-08-08 | End: 2024-08-07

## 2023-08-08 RX ORDER — ESOMEPRAZOLE MAGNESIUM 40 MG/1
40 CAPSULE, DELAYED RELEASE ORAL
Qty: 30 CAPSULE | Refills: 1 | Status: SHIPPED | OUTPATIENT
Start: 2023-08-08 | End: 2023-08-08 | Stop reason: SDUPTHER

## 2023-08-08 RX ORDER — MUPIROCIN 20 MG/G
OINTMENT TOPICAL 3 TIMES DAILY
Qty: 22 G | Refills: 0 | Status: SHIPPED | OUTPATIENT
Start: 2023-08-08 | End: 2023-08-18

## 2023-08-08 RX ORDER — ESOMEPRAZOLE MAGNESIUM 40 MG/1
40 CAPSULE, DELAYED RELEASE ORAL
Qty: 90 CAPSULE | Refills: 3 | Status: SHIPPED | OUTPATIENT
Start: 2023-08-08 | End: 2024-08-07

## 2023-08-08 ASSESSMENT — ACTIVITIES OF DAILY LIVING (ADL)
DRESSING: INDEPENDENT
MANAGING_FINANCES: INDEPENDENT
TAKING_MEDICATION: INDEPENDENT
BATHING: INDEPENDENT
DOING_HOUSEWORK: INDEPENDENT
GROCERY_SHOPPING: INDEPENDENT

## 2023-08-08 ASSESSMENT — PATIENT HEALTH QUESTIONNAIRE - PHQ9
SUM OF ALL RESPONSES TO PHQ9 QUESTIONS 1 AND 2: 0
1. LITTLE INTEREST OR PLEASURE IN DOING THINGS: NOT AT ALL
2. FEELING DOWN, DEPRESSED OR HOPELESS: NOT AT ALL

## 2023-08-08 ASSESSMENT — VISUAL ACUITY
OS_CC: 20/20
OD_CC: 20/20

## 2023-08-08 ASSESSMENT — ENCOUNTER SYMPTOMS
LOSS OF SENSATION IN FEET: 0
DEPRESSION: 0
OCCASIONAL FEELINGS OF UNSTEADINESS: 0

## 2023-10-16 DIAGNOSIS — E78.00 PURE HYPERCHOLESTEROLEMIA: Primary | ICD-10-CM

## 2023-10-16 RX ORDER — EZETIMIBE 10 MG/1
10 TABLET ORAL EVERY EVENING
Qty: 90 TABLET | Refills: 1 | Status: SHIPPED | OUTPATIENT
Start: 2023-10-16

## 2024-02-06 DIAGNOSIS — F41.1 GENERALIZED ANXIETY DISORDER: Primary | ICD-10-CM

## 2024-02-06 RX ORDER — FLUOXETINE 20 MG/1
40 TABLET ORAL DAILY
Qty: 180 TABLET | Refills: 0 | Status: SHIPPED | OUTPATIENT
Start: 2024-02-06 | End: 2024-05-03

## 2024-02-12 NOTE — PROGRESS NOTES
Subjective   Patient ID: Alize Castano is a 63 y.o. female who presents for her 6 month follow up multiple medical conditions     She has lost 9 pounds since before Christmas.   She is still working on weight loss  She has been stressed due to caring for her mother     She has an appt with Dr Matthews in 8/2024.     She had COVID 19 in 11/2023. Sx fully resolved       HEALTH:  PAP 2013, 2/18 - and no longer need to repeat   Mammo 10/18, 9/2020, 11/2021, 11/2022, ordered 8/2023  BD 9/12, 4/19 T-0.9, ordered 8/2023  Colon 2008, 7/2015 with Dr Dixon and 6/2021 and Q 7, will repeat in 2028  Ca cardiac score 6/2012 was ZERO and ordered 8/2023  EKG 5/13, 12/15, 1/18, 6/2020, 6/2021, 2/2023, 8/2023   U/A 12/14, 12/15, 1/17, 2/18, 3/19, 6/2020, 8/2021   Hep C negative 11/2021   FLU 9/16, 9/17, 10/18, 9/19, 9/2020, 10/2021, 11/2022   TDAP 2008 , 5/19  Prevnar 20 recommended but wants to wait 8/2023  Pneumovax never   Zostavax 5/15    Shingrix 6/23/2020 and 9/22/2020  Pfizer CVD vaccine 3/6/2021 and 3/27/2021 and declines more   Ophth She wears glasses. No glaucoma or MD. She has the beginnings of cataracts that is being observed for now.        Review of Systems  All systems negative except those listed in the HPI      Past Medical, Surgical, and Family History reviewed and updated in chart.  Reviewed all medications by prescribing practitioner or clinical pharmacist   (such as prescriptions, OTCs, herbal therapies and supplements) and documented in the medical record       Objective   Visit Vitals  BP (!) 134/92   Pulse 69   Temp 36.4 °C (97.5 °F)   Resp 16    Body mass index is 28.69 kg/m².      Visit Vitals  /80   Pulse 69   Temp 36.4 °C (97.5 °F)   Resp 16    Recheck BP by Dr Jennifer Sánchez MD 2/13/2024     Physical Exam  Vitals reviewed.   Constitutional:       Appearance: Normal appearance.   HENT:      Head: Normocephalic.      Right Ear: Tympanic membrane, ear canal and external ear normal.      Left Ear:  Tympanic membrane, ear canal and external ear normal.      Nose: Nose normal.      Mouth/Throat:      Pharynx: Oropharynx is clear.   Eyes:      Conjunctiva/sclera: Conjunctivae normal.   Cardiovascular:      Rate and Rhythm: Normal rate and regular rhythm.      Pulses: Normal pulses.      Heart sounds: Normal heart sounds.   Pulmonary:      Effort: Pulmonary effort is normal.      Breath sounds: Normal breath sounds.   Abdominal:      General: Bowel sounds are normal.      Palpations: Abdomen is soft.   Musculoskeletal:         General: Normal range of motion.      Cervical back: Normal range of motion and neck supple.   Skin:     General: Skin is warm.   Neurological:      General: No focal deficit present.      Mental Status: She is alert and oriented to person, place, and time.   Psychiatric:         Mood and Affect: Mood normal.         Behavior: Behavior normal.         Thought Content: Thought content normal.         Judgment: Judgment normal.       Assessment/Plan   Problem List Items Addressed This Visit       Chronic constipation    Generalized anxiety disorder    GERD (gastroesophageal reflux disease)    High vitamin D level    Relevant Orders    Vitamin D 25-Hydroxy,Total (for eval of Vitamin D levels)    Hyperlipidemia    Relevant Orders    CBC    Lipid Panel    Hypokalemia    Relevant Medications    potassium chloride CR 10 mEq ER tablet    Other Relevant Orders    Comprehensive Metabolic Panel    Hypothyroidism - Primary    Relevant Orders    TSH with reflex to Free T4 if abnormal    Triiodothyronine, Free    Migraine headache    HERIBERTO (obstructive sleep apnea)    Rathke's cleft cyst (CMS/HCC)    Sensorineural hearing loss (SNHL) of right ear with unrestricted hearing of left ear       Follow up completed  Labs ordered     She has no children and is in a same sex relationship.   She denies previous history of tobacco use  Her dad passed from lymphoma in 2/17 and her mom lives alone   She is a retired  . She still plays softball in the summer     She had COVID 19 in 11/2023. Sx fully resolved   She took Ivermectin tablets and that helped her Sx   Long discussion on Ivermectin for treatment of COVID.   She can not return to the physician she saw for the tablets due to conflict with her being a PCP. Prescription sent in for Ivermectin 3 mg tablets to use prn as directed   Warned patient this is not FDA approved and she has no side effects with medication so I am agreeable to let her try Ivermectin for treatment of COVID     She saw Dr Mcdonald on 8/23/18 for hearing evaluation   Ruled out Menieres disease. She has intermittent tinnitus   Continue vitamin B2 2-3 times a week and Rx given for Antivert.   She saw Dr Ozuna on 9/24/19. She has sensorineural hearing loss of the right ear with unrestricted hearing of left ear.   Recommend she follow with Evonne Ozuna CNP and annual audiological evaluation     Her weight in office today is 183 with BMI of 28.69. We spent 15 minutes discussing diet and weight loss. The struggle of weight loss persists   Explained goal for BMI to be 25 or less      HTN- Not ideal on arrival. BP significantly improved on recheck 2/2024.   She stopped Triamterene/HCTZ 37.5/25 mg.   She can stay off the diuretic for now.   EKG 8/2023 was normal, no LVH or strain pattern noted.   ECHO 2/2023 LV systolic function normal with EF 65 -70%   Stress test 4/2023 no evidence for inducible ischemia or previous myocardial scarring, with normal left and right ventricular size and systolic function and preserved EF  Recommend she monitor her BP at home and call with elevated readings   She saw Dr Delarosa in 4/2023 and only needs to follow prn         I have spent 15 min face to face with this patient discussing their cardiac risk and behavioral therapies of nutrition choices and exercise. We are trying to eliminate habits that are contributing to their cardiac risk.  We agreed on a plan of how they can  reduce their current CV risk   The patient's  10 yr CV risk was estimated at  5.7 % 2/2024      Elevated Lipids:  and HDL 70 on labs in 2/2023. Labs ordered and we will adjust if indicated  2/2024   Explained goal for LDL to be less than 100 and ideally less than 70   She failed Lipitor due to muscle pain.   Continue ezetimibe 10 mg daily   Ca cardiac score 6/2012 was ZERO and ordered 8/2023   Discussed diet and exercise for lipid control      Hypothyroid: Labs ordered and we will adjust if indicated  2/2024   Continue BRANDED Synthroid 100 mcg daily.      Hypokalemia: levels stable on labs in 1/2023. Labs ordered and we will adjust if indicated  2/2024   Continue potassium chloride 10 MEQ 3 days a week      Migrainous vertigo:   Continue sumatriptan 50 mg 2 tablets prn   She has Zofran prn nausea and Meclizine prn vertigo.   Continue Nasonex daily and Mg 250 mg 2 tablets at bedtime   She could not tolerate riboflavin.   MRI brain 9/18 nodular lesion along the superior margin of the pituitary gland extending cranially into the suprasellar region along the right ventral margin measuring approx 9 mm x 5 mm in transaxial dimensions, compared this to MRI done 5/21/2009 and appeared to be unchanged.   Continue acupuncture and massage therapy for migraines   She has a Medrol Dose Jovon on hand to use if she can not break the migraine   She saw Dr Matthews in 7/2023 and will follow again in 8/2024     Forgetfulness and feeling distracted :   I feel this is medication related vs age related   Her stress levels contribute to this  MRI of the brain on 6/2021 stable compared to previous studies   Nodule is stable near pituitary area and not affecting anything      GERD:   Continue Nexium 40 mg daily   EGD was normal in 8/16 and so nothing needed to be changed   She saw Dr Dixon in 5/2021       Anxiety and depression:   She has increased stressors taking care of her mother   Her mom is 90 + y/o. Her dad passed away in 2/17  from lymphoma   She retired from her job 8/1/18.  Continue fluoxetine 40 mg daily. Warned patient of food satiation with SSRI's     IBS- C:   I would like her to stop Benefiber and use her food intake to get fiber   She did not start the FODMAP diet, she found it to be too restrictive.  Colonoscopy 6/17/2021 normal and Q 7   She saw Dr Dixon in 5/2021      Right shoulder and neck pain:  She completed PT with good results   PT helped with all the HA and vertigo issues as well   She saw ortho and they feel she is where she needs to be, no recommendations for sx.     Left knee pain:   There was nothing found on exam with Dr Cuevas at 5/30/19 appt   Xrays of left knee were normal      Right carpal tunnel:  Continue wearing the brace at night   Xrays 3/2021 degenerative changes, most severely at the carpometacarpal joint   She would like to hold off of surgery for now    She saw Dr Akbar in 3/2021      Sleep apnea Dx in 9/17 :  (diagnosed by [facility] at  7/2017 but report is not available for review)    She saw the sleep specialist in 11/2023     Eczema and itchy rash during winter months  Continue mometasone cream prn and this seems to help   She sees derm yearly      Vitamin D deficiency-   She feels more comfortable when her levels are above 50  Continue scripted Vitamin D 50 K UT weekly. Refilled 8/2023      PAP normal 2018 and this was her last one   She had an ovarian cyst and saw Dr Carr   Pelvic U/S 9/2013 right cyst is resolved and no other abnormalities   Mammo + regino 11/2022 was normal and ordered 8/2023  Breast exam normal except scars present from breast reduction surgery in the past 8/2023     BD in 4/19 T -0.9. Continue 1 ES Tums daily and scripted Vitamin D weekly and eat 2 servings of calcium enriched foods daily.   Discussed importance of weight bearing exercises   Colonoscopy 6/2021 and Q 7 with Dr Dixon.      Ophth:   She wears glasses. No glaucoma or MD. She has the beginnings of cataracts that  is being observed for now.   She will have her next eye exam faxed to my office in order to update her medical records.     She has a living will and MPOA. Recommend she bring a copy of her Advanced Directives in office to have scanned in her chart 8/2023     Hep C negative 11/2021   FLU 9/16, 9/17, 10/18, 9/19, 9/2020, 10/2021, 11/2022   TDAP 2008 , 5/19  Prevnar 20 recommended but wants to wait 8/2023  Pneumovax never   Zostavax 5/15    Shingrix 6/23/2020 and 9/22/2020  Pfizer CVD vaccine 3/6/2021 and 3/27/2021 and declines more      Some elements in the chart were copied from Dr. Sánchez's last office visit with patient. Notes have been updated where appropriate, and reflect my current medical decision making from today.      RTC in 6 months for iMCR or sooner if needed   (iMCR due 8/2024)      Scribe Attestation  By signing my name below, I, Teri Galvan , Scribe   attest that this documentation has been prepared under the direction and in the presence of Jennifer Sánchez MD.

## 2024-02-13 ENCOUNTER — OFFICE VISIT (OUTPATIENT)
Dept: PRIMARY CARE | Facility: CLINIC | Age: 64
End: 2024-02-13
Payer: MEDICARE

## 2024-02-13 ENCOUNTER — TELEPHONE (OUTPATIENT)
Dept: PRIMARY CARE | Facility: CLINIC | Age: 64
End: 2024-02-13

## 2024-02-13 VITALS
RESPIRATION RATE: 16 BRPM | OXYGEN SATURATION: 96 % | DIASTOLIC BLOOD PRESSURE: 80 MMHG | HEART RATE: 69 BPM | TEMPERATURE: 97.5 F | WEIGHT: 183.2 LBS | BODY MASS INDEX: 28.69 KG/M2 | SYSTOLIC BLOOD PRESSURE: 132 MMHG

## 2024-02-13 DIAGNOSIS — E87.6 HYPOKALEMIA: ICD-10-CM

## 2024-02-13 DIAGNOSIS — K21.00 GASTROESOPHAGEAL REFLUX DISEASE WITH ESOPHAGITIS WITHOUT HEMORRHAGE: ICD-10-CM

## 2024-02-13 DIAGNOSIS — F41.1 GENERALIZED ANXIETY DISORDER: ICD-10-CM

## 2024-02-13 DIAGNOSIS — E67.3 HIGH VITAMIN D LEVEL: ICD-10-CM

## 2024-02-13 DIAGNOSIS — E03.9 HYPOTHYROIDISM, UNSPECIFIED TYPE: Primary | ICD-10-CM

## 2024-02-13 DIAGNOSIS — G47.33 OSA (OBSTRUCTIVE SLEEP APNEA): ICD-10-CM

## 2024-02-13 DIAGNOSIS — G43.119 INTRACTABLE MIGRAINE WITH AURA WITHOUT STATUS MIGRAINOSUS: ICD-10-CM

## 2024-02-13 DIAGNOSIS — K59.09 CHRONIC CONSTIPATION: ICD-10-CM

## 2024-02-13 DIAGNOSIS — J06.9 UPPER RESPIRATORY TRACT INFECTION, UNSPECIFIED TYPE: ICD-10-CM

## 2024-02-13 DIAGNOSIS — E78.5 HYPERLIPIDEMIA, UNSPECIFIED HYPERLIPIDEMIA TYPE: ICD-10-CM

## 2024-02-13 DIAGNOSIS — H90.41 SENSORINEURAL HEARING LOSS (SNHL) OF RIGHT EAR WITH UNRESTRICTED HEARING OF LEFT EAR: ICD-10-CM

## 2024-02-13 DIAGNOSIS — E23.6 RATHKE'S CLEFT CYST (MULTI): ICD-10-CM

## 2024-02-13 PROBLEM — M79.641 BILATERAL HAND PAIN: Status: RESOLVED | Noted: 2023-02-27 | Resolved: 2024-02-13

## 2024-02-13 PROBLEM — S99.922A INJURY OF LEFT TOE: Status: RESOLVED | Noted: 2023-02-27 | Resolved: 2024-02-13

## 2024-02-13 PROBLEM — M79.642 BILATERAL HAND PAIN: Status: RESOLVED | Noted: 2023-02-27 | Resolved: 2024-02-13

## 2024-02-13 PROBLEM — R19.7 DIARRHEA IN ADULT PATIENT: Status: RESOLVED | Noted: 2023-02-27 | Resolved: 2024-02-13

## 2024-02-13 PROCEDURE — 1160F RVW MEDS BY RX/DR IN RCRD: CPT | Performed by: INTERNAL MEDICINE

## 2024-02-13 PROCEDURE — 1123F ACP DISCUSS/DSCN MKR DOCD: CPT | Performed by: INTERNAL MEDICINE

## 2024-02-13 PROCEDURE — 99214 OFFICE O/P EST MOD 30 MIN: CPT | Performed by: INTERNAL MEDICINE

## 2024-02-13 PROCEDURE — 1036F TOBACCO NON-USER: CPT | Performed by: INTERNAL MEDICINE

## 2024-02-13 PROCEDURE — 1159F MED LIST DOCD IN RCRD: CPT | Performed by: INTERNAL MEDICINE

## 2024-02-13 RX ORDER — IVERMECTIN 3 MG/1
12 TABLET ORAL DAILY PRN
Qty: 40 TABLET | Refills: 0 | Status: SHIPPED | OUTPATIENT
Start: 2024-02-13 | End: 2024-02-13 | Stop reason: SDUPTHER

## 2024-02-13 RX ORDER — SOD CHLOR,BICARB/SQUEEZ BOTTLE
PACKET, WITH RINSE DEVICE NASAL
COMMUNITY

## 2024-02-13 RX ORDER — IVERMECTIN 3 MG/1
12 TABLET ORAL DAILY PRN
Qty: 30 TABLET | Refills: 0 | Status: SHIPPED | OUTPATIENT
Start: 2024-02-13 | End: 2024-02-13 | Stop reason: SDUPTHER

## 2024-02-13 RX ORDER — POTASSIUM CHLORIDE 750 MG/1
10 TABLET, FILM COATED, EXTENDED RELEASE ORAL DAILY PRN
Qty: 90 TABLET | Refills: 3 | Status: SHIPPED | OUTPATIENT
Start: 2024-02-13 | End: 2025-02-12

## 2024-02-13 RX ORDER — IVERMECTIN 3 MG/1
TABLET ORAL
Qty: 40 TABLET | Refills: 0 | Status: SHIPPED | OUTPATIENT
Start: 2024-02-13

## 2024-02-13 ASSESSMENT — PATIENT HEALTH QUESTIONNAIRE - PHQ9
1. LITTLE INTEREST OR PLEASURE IN DOING THINGS: NOT AT ALL
SUM OF ALL RESPONSES TO PHQ9 QUESTIONS 1 AND 2: 0
2. FEELING DOWN, DEPRESSED OR HOPELESS: NOT AT ALL

## 2024-02-13 NOTE — TELEPHONE ENCOUNTER
Please look at her invermectin prescription, there are 2 directions on there, there is a note to compound 12 mg and take one capsule daily is that what you want?

## 2024-05-02 DIAGNOSIS — F41.1 GENERALIZED ANXIETY DISORDER: ICD-10-CM

## 2024-05-03 RX ORDER — FLUOXETINE 20 MG/1
TABLET ORAL
Qty: 180 TABLET | Refills: 2 | Status: SHIPPED | OUTPATIENT
Start: 2024-05-03

## 2024-06-11 DIAGNOSIS — E03.9 HYPOTHYROIDISM, UNSPECIFIED TYPE: ICD-10-CM

## 2024-06-11 RX ORDER — LEVOTHYROXINE SODIUM 100 UG/1
100 TABLET ORAL DAILY
Qty: 90 TABLET | Refills: 3 | Status: SHIPPED | OUTPATIENT
Start: 2024-06-11

## 2024-06-24 DIAGNOSIS — E78.00 PURE HYPERCHOLESTEROLEMIA: ICD-10-CM

## 2024-06-24 RX ORDER — EZETIMIBE 10 MG/1
10 TABLET ORAL EVERY EVENING
Qty: 90 TABLET | Refills: 1 | Status: SHIPPED | OUTPATIENT
Start: 2024-06-24

## 2024-07-16 ENCOUNTER — OFFICE VISIT (OUTPATIENT)
Dept: NEUROLOGY | Facility: CLINIC | Age: 64
End: 2024-07-16
Payer: MEDICARE

## 2024-07-16 VITALS
HEIGHT: 67 IN | SYSTOLIC BLOOD PRESSURE: 132 MMHG | DIASTOLIC BLOOD PRESSURE: 79 MMHG | HEART RATE: 69 BPM | BODY MASS INDEX: 28.69 KG/M2

## 2024-07-16 DIAGNOSIS — G43.009 MIGRAINE WITHOUT AURA AND WITHOUT STATUS MIGRAINOSUS, NOT INTRACTABLE: ICD-10-CM

## 2024-07-16 DIAGNOSIS — G43.109 MIGRAINOUS VERTIGO: Primary | ICD-10-CM

## 2024-07-16 PROCEDURE — 1160F RVW MEDS BY RX/DR IN RCRD: CPT | Performed by: PSYCHIATRY & NEUROLOGY

## 2024-07-16 PROCEDURE — 1036F TOBACCO NON-USER: CPT | Performed by: PSYCHIATRY & NEUROLOGY

## 2024-07-16 PROCEDURE — 99214 OFFICE O/P EST MOD 30 MIN: CPT | Performed by: PSYCHIATRY & NEUROLOGY

## 2024-07-16 PROCEDURE — 1159F MED LIST DOCD IN RCRD: CPT | Performed by: PSYCHIATRY & NEUROLOGY

## 2024-07-16 PROCEDURE — 1123F ACP DISCUSS/DSCN MKR DOCD: CPT | Performed by: PSYCHIATRY & NEUROLOGY

## 2024-07-16 RX ORDER — SUMATRIPTAN 50 MG/1
50 TABLET, FILM COATED ORAL ONCE AS NEEDED
Qty: 9 TABLET | Refills: 2 | Status: SHIPPED | OUTPATIENT
Start: 2024-07-16

## 2024-07-16 NOTE — PATIENT INSTRUCTIONS
I'm glad to hear that you have done well over the past year.    We discussed that you have had very little occasion to need sumatriptan, but that your supply is low.  In case you do require it over the coming year I sent a refill authorization to your retail pharmacy.    Please see me in 1 year.

## 2024-07-16 NOTE — PROGRESS NOTES
Subjective     Alize Castano is a 64 y.o. year old female seen in follow-up for migrainous vertigo, migraine headaches, Rathke's cleft cyst.    HPI    64-year-old right-handed woman, retired , with past medical history significant for hypothyroidism, GERD, sleep apnea, right shoulder surgery, resection of benign right lung nodule, cholecystectomy, IBS.     I saw her initially on 8/30/18, referred by Dr. Vivi Mcdonald, ENT. She presented for episodic vertigo as well as coordination complaints. I suspected a migrainous basis to her vertigo and it was noted that Ménière's disease was not confirmed after ENT evaluation. I sent her for a brain MRI and vitamin B 12 level because of coordination complaints. B-12 level was supranormal. Brain MRI showed nondescript white matter hyperintensities and a suprasellar cystic lesion which was stable compared to a prior MRI from 2009. I recommended riboflavin as a natural migraine preventive. She was taking meclizine for vertigo.     I evaluated her again on 2/25/2019. At that visit she indicated multiple interval episodes of vertigo without clear features of Ménière's disease. I suspected a migrainous basis. She also indicated a single migraine headache after more than 7 years without any. I recommended topiramate for migrainous vertigo but she subsequently indicated never having started topiramate.     I evaluated her again on 8/3/2021, in the office. She indicated resolution of vertigo. She did have a severe migraine attack in September 2020 after getting the second shingles vaccine and she noted lack of response to multiple doses of sumatriptan. The headache was for the most part resolved by the next day. She did not proceed to the ED at that time. She had undergone another brain MRI on 6/9/2021 which showed multiple small peripherally placed white matter hyperintensities scattered throughout both cerebral hemispheres in a nonspecific pattern, stable compared to prior  study from 2018. The 8 x 5 mm suprasellar lesion also appeared stable compared to 2018 and essentially stable compared to a prior MRI from 2009, and compatible with a Rathke's cleft cyst.     I evaluated her again on 8/4/2022 in the office. Her neurologic exam was stable and she had done well over the past year, attributing significant improvement in her headache pattern to diet and exercise.    I evaluated her most recently on 7/18/2023.  She was doing well from the standpoint of headaches and migrainous vertigo.  She indicated some episodes of transient cognitive dysfunction which sounded highly likely to relate to mental fatigue in the context of caring for her ailing mother and not sleeping well.  We discussed the importance of getting adequate rest/sleep and taking care of her own health.    She is evaluated again today in the office.    She indicates she has done well since the last visit.  She has had no debilitating or frequent headaches.  She has had no recent episodes of vertigo.    Consequently she has not had recent occasion to take meclizine or sumatriptan.  She does indicate that she is down to a single tablet of sumatriptan however, and expresses interest in a refill just in case it is needed.    She has been doing a nasal rinse regimen using distilled water and it seems to help keep symptoms at bay.  She has noted that she will develop an earache, typically either bilateral or restricted to the left side, if she lapses off a regular nasal rinse regimen.    She continues to care for her mother and reports a lot of stress in this context over the past 5 months, yet she is sleeping better.  She is wearing CPAP.  She has been limiting her salt intake and is making other dietary changes to try to lose weight.    She notes no major visual issues but did have a recent instance of finding it somewhat difficult to do night driving on the [place] [place].  She has an upcoming appointment with  ophthalmology.       Review of Systems    As per the history of present illness    Patient Active Problem List   Diagnosis    Carpal tunnel syndrome of right wrist    Chronic constipation    Generalized anxiety disorder    GERD (gastroesophageal reflux disease)    Hernia, hiatal    High vitamin D level    Hyperlipidemia    Hypokalemia    Hypothyroidism    Insulin resistance syndrome    Left hand weakness    Lesion of skin of face    Lumbar pain    Migraine headache    Migrainous vertigo    Neck pain, chronic    HERIBERTO (obstructive sleep apnea)    Primary osteoarthritis of first carpometacarpal joint of left hand    Rathke's cleft cyst (Multi)    Sensorineural hearing loss (SNHL) of right ear with unrestricted hearing of left ear    Tinnitus of both ears    Vertigo    Vitamin D deficiency    Scapular dysfunction    Breast disorder    Disorder of bursae and tendons in shoulder region    Fatigue    Impingement syndrome of right shoulder    Cholelithiasis    Melanocytic nevi of trunk    Melanocytic nevi of unspecified lower limb, including hip    Other melanin hyperpigmentation     Past Medical History:   Diagnosis Date    Acute pharyngitis, unspecified 04/23/2015    Acute viral pharyngitis    Acute tonsillitis, unspecified 10/01/2019    Tonsillitis with exudate    Acute upper respiratory infection, unspecified 11/27/2019    Acute upper respiratory infection    Contact with and (suspected) exposure to other viral communicable diseases 12/23/2019    Exposure to influenza    Cough, unspecified 04/23/2015    Cough    Cramp and spasm 03/11/2016    Calf cramp    Disorientation, unspecified 05/17/2021    Confusion and disorientation    Diverticulosis of intestine, part unspecified, without perforation or abscess without bleeding     Diverticulosis    Other abnormal and inconclusive findings on diagnostic imaging of breast 11/18/2015    Abnormal mammogram    Other lack of coordination 08/30/2018    Decreased coordination    Pain  in arm, unspecified 01/17/2014    Arm pain    Personal history of diseases of the skin and subcutaneous tissue 12/02/2014    History of eczema    Personal history of other diseases of the female genital tract 06/20/2013    History of dysfunctional uterine bleeding    Personal history of other diseases of the female genital tract 06/20/2013    History of ovarian cyst    Personal history of other diseases of the respiratory system     History of sore throat    Personal history of other diseases of the respiratory system 12/14/2018    History of acute bronchitis    Personal history of other specified conditions 08/30/2018    History of vertigo    Personal history of other specified conditions 09/08/2014    History of diarrhea    Personal history of urinary (tract) infections 11/27/2013    History of acute cystitis    Sleep apnea, unspecified 04/12/2018    Sleep apnea in adult    Strain of muscle, fascia and tendon of lower back, initial encounter 03/09/2016    Strain of lumbar region, initial encounter    Strain of muscle, fascia and tendon of lower back, initial encounter 06/15/2016    Strain of lumbar region, initial encounter    Vitamin deficiency, unspecified 05/13/2015    Vitamin deficiency     Past Surgical History:   Procedure Laterality Date    BREAST SURGERY  09/08/2014    Breast Surgery Reduction Procedure    CHOLECYSTECTOMY  06/13/2016    Cholecystectomy    COLONOSCOPY  07/13/2015    Complete Colonoscopy    ESOPHAGOGASTRODUODENOSCOPY  06/13/2016    Diagnostic Esophagogastroduodenoscopy    OTHER SURGICAL HISTORY  11/25/2013    Wedge Resection Of Lung     Social History     Tobacco Use    Smoking status: Never    Smokeless tobacco: Never   Substance Use Topics    Alcohol use: Yes     Comment: occasional     family history includes Alcohol abuse in her father; Dementia in her father; Heart attack in her father, maternal grandfather, maternal grandmother, mother, mother's brother, and mother's sister; Heart  disease in her father; Irritable bowel syndrome in her sister; Transient ischemic attack in her father; Ulcerative colitis in her mother; stomach disorder in her sister.    Current Outpatient Medications:     cyanocobalamin (Vitamin B-12) 1,000 mcg tablet, Take 1 tablet (1,000 mcg) by mouth once daily., Disp: , Rfl:     ergocalciferol (Vitamin D-2) 1.25 MG (68717 UT) capsule, Take 1 capsule (1,250 mcg) by mouth 1 (one) time per week., Disp: 12 capsule, Rfl: 3    esomeprazole (NexIUM) 40 mg DR capsule, Take 1 capsule (40 mg) by mouth once daily in the morning. Take before meals. Do not open capsule., Disp: 90 capsule, Rfl: 3    ezetimibe (Zetia) 10 mg tablet, Take 1 tablet (10 mg) by mouth once daily in the evening., Disp: 90 tablet, Rfl: 1    FLUoxetine (PROzac) 20 mg tablet, TAKE 2 TABLETS (40MG) ONCE DAILY, Disp: 180 tablet, Rfl: 2    hydrocortisone 2.5 % cream, Apply topically., Disp: , Rfl:     ivermectin (Stromectol) 3 mg tablet, Please compound to make 1 capsule of 12 mg to be taken daily during viral illness for 3 days at a time, Disp: 40 tablet, Rfl: 0    meclizine (Antivert) 25 mg tablet, Take 1 tablet (25 mg) by mouth 3 times a day as needed., Disp: , Rfl:     ondansetron ODT (Zofran-ODT) 4 mg disintegrating tablet, Take 2 tablets (8 mg) by mouth every 8 hours if needed for nausea., Disp: , Rfl:     potassium chloride CR 10 mEq ER tablet, Take 1 tablet (10 mEq) by mouth once daily as needed (hypokalemia and spasms)., Disp: 90 tablet, Rfl: 3    sod chloride-sodium bicarb (Nasal Wash) nasal rinse, Administer into affected nostril(s)., Disp: , Rfl:     SUMAtriptan (Imitrex) 50 mg tablet, Take 1 tablet (50 mg) by mouth. AT THE ONSET OF HEADACHE, MAY REPEAT IF NECESSARY TWO HOURS LATER, Disp: , Rfl:     Synthroid 100 mcg tablet, TAKE 1 TABLET ONCE DAILY, Disp: 90 tablet, Rfl: 3  Allergies   Allergen Reactions    Codeine Nausea/vomiting    Meperidine (Pf) Other    Olopatadine Headache    Other Other     nausea     Statins-Hmg-Coa Reductase Inhibitors Other     Severe muscle aches and tendinitis hand       Objective   Neurological Exam  Physical Exam    Physical Examination:    General: Alert woman who was ambulatory without assistive devices.      Mental Status: Clear sensorium without fluctuation.  Appropriate in conversation.  Fluent unremarkable speech without paraphasic errors or hesitancy.  Followed instructions accurately and promptly without bradyphrenia.    Cranial Nerves:  Funduscopic exam was not well visualized bilaterally on nondilated exam.  Pupils were equal, round and reactive to light with no relative afferent pupillary defect.  Extraocular movements were intact and conjugate without nystagmus.  No ptosis.  Visual fields were full to confrontation tested binocularly.  Facial sensation was symmetric to pin.  Facial motor function was symmetrically intact.  Hearing was grossly intact.  No dysarthria.  Shoulder shrug was symmetric.  Tongue protrusion was midline.    Coordination: Finger to finger and alternating hand movements were rapid and accurate without dysmetria. There was no tremor, myoclonus or dystonic posturing.    Sensation: Romberg sign was absent.     Station: Intact and stable.    Gait: Stable and unremarkable.  Intact tandem.    Assessment/Plan     She continues to do well neurologically.    She has had very little headache activity since the last visit and no recent episodes of vertigo.    Despite the ongoing stress associated with care of her elderly mother, she has been sleeping better and is taking better care of her health in general.    I provided a refill for sumatriptan in case she requires it prior to the next visit.    [place] found night driving somewhat challenging.  That said, she does not have other visual complaints.  She has an upcoming appointment with ophthalmology.    I advised her to follow-up here in 1 year.

## 2024-09-05 ENCOUNTER — LAB (OUTPATIENT)
Dept: LAB | Facility: LAB | Age: 64
End: 2024-09-05
Payer: MEDICARE

## 2024-09-05 DIAGNOSIS — E67.3 HIGH VITAMIN D LEVEL: ICD-10-CM

## 2024-09-05 DIAGNOSIS — E03.9 HYPOTHYROIDISM, UNSPECIFIED TYPE: ICD-10-CM

## 2024-09-05 DIAGNOSIS — E87.6 HYPOKALEMIA: ICD-10-CM

## 2024-09-05 DIAGNOSIS — E78.5 HYPERLIPIDEMIA, UNSPECIFIED HYPERLIPIDEMIA TYPE: ICD-10-CM

## 2024-09-05 LAB
25(OH)D3 SERPL-MCNC: 80 NG/ML (ref 30–100)
ALBUMIN SERPL BCP-MCNC: 4.3 G/DL (ref 3.4–5)
ALP SERPL-CCNC: 75 U/L (ref 33–136)
ALT SERPL W P-5'-P-CCNC: 20 U/L (ref 7–45)
ANION GAP SERPL CALC-SCNC: 10 MMOL/L (ref 10–20)
AST SERPL W P-5'-P-CCNC: 15 U/L (ref 9–39)
BILIRUB SERPL-MCNC: 0.7 MG/DL (ref 0–1.2)
BUN SERPL-MCNC: 11 MG/DL (ref 6–23)
CALCIUM SERPL-MCNC: 9.5 MG/DL (ref 8.6–10.6)
CHLORIDE SERPL-SCNC: 105 MMOL/L (ref 98–107)
CHOLEST SERPL-MCNC: 268 MG/DL (ref 0–199)
CHOLESTEROL/HDL RATIO: 3.7
CO2 SERPL-SCNC: 28 MMOL/L (ref 21–32)
CREAT SERPL-MCNC: 0.78 MG/DL (ref 0.5–1.05)
EGFRCR SERPLBLD CKD-EPI 2021: 84 ML/MIN/1.73M*2
ERYTHROCYTE [DISTWIDTH] IN BLOOD BY AUTOMATED COUNT: 11.9 % (ref 11.5–14.5)
GLUCOSE SERPL-MCNC: 89 MG/DL (ref 74–99)
HCT VFR BLD AUTO: 42 % (ref 36–46)
HDLC SERPL-MCNC: 71.7 MG/DL
HGB BLD-MCNC: 14.2 G/DL (ref 12–16)
LDLC SERPL CALC-MCNC: 172 MG/DL
MCH RBC QN AUTO: 31.6 PG (ref 26–34)
MCHC RBC AUTO-ENTMCNC: 33.8 G/DL (ref 32–36)
MCV RBC AUTO: 93 FL (ref 80–100)
NON HDL CHOLESTEROL: 196 MG/DL (ref 0–149)
NRBC BLD-RTO: 0 /100 WBCS (ref 0–0)
PLATELET # BLD AUTO: 220 X10*3/UL (ref 150–450)
POTASSIUM SERPL-SCNC: 3.9 MMOL/L (ref 3.5–5.3)
PROT SERPL-MCNC: 6.8 G/DL (ref 6.4–8.2)
RBC # BLD AUTO: 4.5 X10*6/UL (ref 4–5.2)
SODIUM SERPL-SCNC: 139 MMOL/L (ref 136–145)
T3FREE SERPL-MCNC: 3 PG/ML (ref 2.3–4.2)
TRIGL SERPL-MCNC: 121 MG/DL (ref 0–149)
TSH SERPL-ACNC: 0.89 MIU/L (ref 0.44–3.98)
VLDL: 24 MG/DL (ref 0–40)
WBC # BLD AUTO: 6.2 X10*3/UL (ref 4.4–11.3)

## 2024-09-05 PROCEDURE — 84443 ASSAY THYROID STIM HORMONE: CPT

## 2024-09-05 PROCEDURE — 84481 FREE ASSAY (FT-3): CPT

## 2024-09-05 PROCEDURE — 80061 LIPID PANEL: CPT

## 2024-09-05 PROCEDURE — 82306 VITAMIN D 25 HYDROXY: CPT

## 2024-09-05 PROCEDURE — 36415 COLL VENOUS BLD VENIPUNCTURE: CPT

## 2024-09-05 PROCEDURE — 80053 COMPREHEN METABOLIC PANEL: CPT

## 2024-09-05 PROCEDURE — 85027 COMPLETE CBC AUTOMATED: CPT

## 2024-09-06 NOTE — RESULT ENCOUNTER NOTE
Guilherme Herrmann-  The cholesterol is a little higher than before and really need to get the LDL <100 and wonder if stopped the Zetia   Thyroid is normal   Rest of the labs are good range

## 2024-09-07 DIAGNOSIS — Z12.31 VISIT FOR SCREENING MAMMOGRAM: Primary | ICD-10-CM

## 2024-09-07 DIAGNOSIS — E78.5 HYPERLIPIDEMIA, UNSPECIFIED HYPERLIPIDEMIA TYPE: ICD-10-CM

## 2024-09-07 DIAGNOSIS — Z78.0 ASYMPTOMATIC MENOPAUSAL STATE: ICD-10-CM

## 2024-09-09 NOTE — PROGRESS NOTES
Subjective   Reason for Visit: Alize Castano is an 64 y.o. female here for her Initial Medicare Assessment and follow up       Recommend she bring a copy of her Advanced Directives in office to have scanned in her chart 8/2023  She has 1-2 glasses of wine 3-4 times a week      Last mcr 8/8/23- dep     She saw Dr Matthews in 8/24 for migraines- nothing changed          HEALTH:  PAP 2013, 2/18 - and no longer needs to repeat   Mammo 10/18, 9/20, 11/21, 11/22, ordered 8/2023  BD 9/12, 4/19 T-0.9, ordered 8/2023  Colon 2008, 7/15, 6/21 and Q 7, will repeat in 2028  Ca cardiac score 6/12 was ZERO and ordered 8/2023  EKG 5/13, 12/15, 1/18, 6/20, 6/21, 2/23, 8/23   U/A 12/14, 12/15, 1/17, 2/18, 3/19, 6/20, 8/21   Hep C negative 11/2021   FLU 9/17, 10/18, 9/19, 9/20, 10/21, 11/22   TDAP 2008 , 5/19  Prevnar 20 recommended but wants to wait 8/2023  Pneumovax never   Zostavax 5/15    Shingrix 6/23/2020 and 9/22/2020  Pfizer CVD vaccine 3/6/2021 and 3/27/2021 and declines more   Ophth She wears glasses. No glaucoma or MD. She has the beginnings of cataracts that is being observed for now.        HPI    Patient Care Team:  Jennifer Sánchez MD as PCP - General  Jennifer Sán[facility]     Review of Systems    Objective   Vitals:  There were no vitals taken for this visit.      Physical Exam    Assessment & Plan         Initial Medicare Assessment and follow up completed  Reviewed her labs from 9/24     Medicare Wellness completed  -  Discussed healthy diet and regular exercise.    -  Physical exam overall unremarkable. Immunizations reviewed and updated accordingly. Healthy lifestyle choices discussed (tobacco avoidance, appropriate alcohol use, avoidance of illicit substances).   -  Patient is wearing seatbelt.   -  Screening lab work ordered as indicated.    -  Age appropriate screening tests reviewed with patient.         We spent 15 minutes discussing depression screen and there is nothing  found that is of concern for underling depression. The PQH form was filled and the meds reviewed.  No depression to report      We spent 15 minutes discussing alcohol use and there are no concerns about overuse. The 15 min was spent in going over any issues of use of alcohol.   She has 1-2 glasses of wine 3-4 times a week      She has grab bars in the shower.  She has not fallen recently and no risk of falls in the house   She has good lighting around the house and functioning smoke detectors.     She has no children and is in a committed relationship.   She denies previous history of tobacco use  Her dad passed from lymphoma in 2/17 and her mom lives alone   She is a retired . She still plays softball in the summer      She had COVID 19 in 11/2023. Sx fully resolved   She took Ivermectin tablets and that helped her Sx   Long discussion on Ivermectin for treatment of COVID.   She can not return to the physician she saw for the tablets due to conflict with her being a PCP. Prescription sent in for Ivermectin 3 mg tablets to use prn as directed   Warned patient this is not FDA approved and she has no side effects with medication so I am agreeable to let her try Ivermectin for treatment of COVID      She saw Dr Mcdonald on 8/23/18 for hearing evaluation   Ruled out Menieres disease. She has intermittent tinnitus   Continue vitamin B2 2-3 times a week and Rx given for Antivert.   She saw Dr Ozuna on 9/24/19. She has sensorineural hearing loss of the right ear with unrestricted hearing of left ear.   Recommend she follow with Evonne Ozuna CNP and annual audiological evaluation     Her weight in office today is 183 with BMI of 28.69. We spent 15 minutes discussing diet and weight loss. The struggle of weight loss persists   Explained goal for BMI to be 25 or less      HTN- Not ideal on arrival. BP significantly improved on recheck 2/2024.   She stopped Triamterene/HCTZ 37.5/25 mg.   She can stay off the diuretic for  now.   EKG 8/2023 was normal, no LVH or strain pattern noted.   ECHO 2/2023 LV systolic function normal with EF 65 -70%   Stress test 4/2023 no evidence for inducible ischemia or previous myocardial scarring, with normal left and right ventricular size and systolic function and preserved EF  Recommend she monitor her BP at home and call with elevated readings   She saw Dr Delarosa in 4/2023 and only needs to follow prn         I have spent 15 min face to face with this patient discussing their cardiac risk and behavioral therapies of nutrition choices and exercise. We are trying to eliminate habits that are contributing to their cardiac risk.  We agreed on a plan of how they can reduce their current CV risk   The patient's  10 yr CV risk was estimated at  5.7 % 2/2024      Elevated Lipids:  and HDL 70 on labs in 2/2023. Labs ordered and we will adjust if indicated  2/2024   Explained goal for LDL to be less than 100 and ideally less than 70   She failed Lipitor due to muscle pain.   Continue ezetimibe 10 mg daily   Ca cardiac score 6/2012 was ZERO and ordered 8/2023   Discussed diet and exercise for lipid control      Hypothyroid: Labs ordered and we will adjust if indicated  2/2024   Continue BRANDED Synthroid 100 mcg daily.      Hypokalemia: levels stable on labs in 1/2023. Labs ordered and we will adjust if indicated  2/2024   Continue potassium chloride 10 MEQ 3 days a week      Migrainous vertigo:   Continue sumatriptan 50 mg 2 tablets prn   She has Zofran prn nausea and Meclizine prn vertigo.   Continue Nasonex daily and Mg 250 mg 2 tablets at bedtime   She could not tolerate riboflavin.   MRI brain 9/18 nodular lesion along the superior margin of the pituitary gland extending cranially into the suprasellar region along the right ventral margin measuring approx 9 mm x 5 mm in transaxial dimensions, compared this to MRI done 5/21/2009 and appeared to be unchanged.   Continue acupuncture and massage therapy  for migraines   She has a Medrol Dose Jovon on hand to use if she can not break the migraine   She saw Dr Matthews in 8/24     Forgetfulness and feeling distracted :   I feel this is medication related vs age related   Her stress levels contribute to this  MRI of the brain on 6/2021 stable compared to previous studies   Nodule is stable near pituitary area and not affecting anything      GERD:   Continue Nexium 40 mg daily   EGD was normal in 8/16 and so nothing needed to be changed   She saw Dr Dixon in 5/2021       Anxiety and depression:   She has increased stressors taking care of her mother   Her mom is 90 + y/o. Her dad passed away in 2/17 from lymphoma   She retired from her job 8/1/18.  Continue fluoxetine 40 mg daily. Warned patient of food satiation with SSRI's     IBS- C:   I would like her to stop Benefiber and use her food intake to get fiber   She did not start the FODMAP diet, she found it to be too restrictive.  Colonoscopy 6/17/2021 normal and Q 7   She saw Dr Dixon in 5/2021      Right shoulder and neck pain:  She completed PT with good results   PT helped with all the HA and vertigo issues as well   She saw ortho and they feel she is where she needs to be, no recommendations for sx.     Left knee pain:   There was nothing found on exam with Dr Cuevas at 5/30/19 appt   Xrays of left knee were normal      Right carpal tunnel:  Continue wearing the brace at night   Xrays 3/2021 degenerative changes, most severely at the carpometacarpal joint   She would like to hold off of surgery for now    She saw Dr Akbar in 3/2021      Sleep apnea Dx in 9/17 :  (diagnosed by [facility] at  7/2017 but report is not available for review)    She saw the sleep specialist in 11/2023     Eczema and itchy rash during winter months  Continue mometasone cream prn and this seems to help   She sees derm yearly      Vitamin D deficiency-   She feels more comfortable when her levels are above 50  Continue scripted Vitamin D 50 K  UT weekly. Refilled 8/2023      PAP normal 2018 and this was her last one   She had an ovarian cyst and saw Dr Carr   Pelvic U/S 9/2013 right cyst is resolved and no other abnormalities   Mammo + regino 11/2022 was normal and ordered 8/2023  Breast exam normal except scars present from breast reduction surgery in the past 8/2023     BD in 4/19 T -0.9. Continue 1 ES Tums daily and scripted Vitamin D weekly and eat 2 servings of calcium enriched foods daily.   Discussed importance of weight bearing exercises   Colonoscopy 6/2021 and Q 7 with Dr Dixon.      Ophth:   She wears glasses. No glaucoma or MD. She has the beginnings of cataracts that is being observed for now.   She will have her next eye exam faxed to my office in order to update her medical records.     I spent 15 min with the patient discussing their wishes for end of life choices.   We discussed the need for a Living Will and that wishes should be discussed with Family. The DNR status was reviewed, and we discussed the options of this and, the DNR _CC options as well.   We also went over how important it was to have these choices written down and clear for any surviving family so that their wishes are followed   The patient and I came to to following agreement :   She has a living will and MPOA. Recommend she bring a copy of her Advanced Directives in office to have scanned in her chart 9/24     Hep C negative 11/2021   FLU 9/17, 10/18, 9/19, 9/20, 10/21, 11/22   TDAP 2008 , 5/19  Prevnar 20 recommended but wants to wait 8/2023  Pneumovax never   Zostavax 5/15    Shingrix 6/23/2020 and 9/22/2020  Pfizer CVD vaccine 3/6/2021 and 3/27/2021 and declines more       Some elements in the chart were copied from Dr. Sánchez's last office visit with patient. Notes have been updated where appropriate, and reflect my current medical decision making from today.      RTC in 6 months for iMCR or sooner if needed   (iMCR due 8/2024)      Scribe Attestation  By signing  my name below, I, Marylou Peterson   attest that this documentation has been prepared under the direction and in the presence of Jennifer Sánchez MD.

## 2024-09-10 ENCOUNTER — APPOINTMENT (OUTPATIENT)
Dept: PRIMARY CARE | Facility: CLINIC | Age: 64
End: 2024-09-10
Payer: MEDICARE

## 2024-09-10 ENCOUNTER — HOSPITAL ENCOUNTER (OUTPATIENT)
Dept: RADIOLOGY | Facility: CLINIC | Age: 64
Discharge: HOME | End: 2024-09-10
Payer: MEDICARE

## 2024-09-10 ENCOUNTER — OFFICE VISIT (OUTPATIENT)
Dept: PRIMARY CARE | Facility: CLINIC | Age: 64
End: 2024-09-10
Payer: MEDICARE

## 2024-09-10 DIAGNOSIS — Z78.9 STATIN INTOLERANCE: ICD-10-CM

## 2024-09-10 DIAGNOSIS — E03.9 HYPOTHYROIDISM, UNSPECIFIED TYPE: ICD-10-CM

## 2024-09-10 DIAGNOSIS — Z23 IMMUNIZATION DUE: ICD-10-CM

## 2024-09-10 DIAGNOSIS — K21.00 GASTROESOPHAGEAL REFLUX DISEASE WITH ESOPHAGITIS WITHOUT HEMORRHAGE: ICD-10-CM

## 2024-09-10 DIAGNOSIS — E78.5 DYSLIPIDEMIA, GOAL LDL BELOW 100: ICD-10-CM

## 2024-09-10 DIAGNOSIS — E78.5 HYPERLIPIDEMIA, UNSPECIFIED HYPERLIPIDEMIA TYPE: ICD-10-CM

## 2024-09-10 DIAGNOSIS — R03.0 ELEVATED BLOOD PRESSURE READING WITHOUT DIAGNOSIS OF HYPERTENSION: ICD-10-CM

## 2024-09-10 DIAGNOSIS — Z00.00 ROUTINE GENERAL MEDICAL EXAMINATION AT HEALTH CARE FACILITY: Primary | ICD-10-CM

## 2024-09-10 DIAGNOSIS — E55.9 VITAMIN D DEFICIENCY: ICD-10-CM

## 2024-09-10 DIAGNOSIS — R03.0 ELEVATED BLOOD PRESSURE READING: ICD-10-CM

## 2024-09-10 PROCEDURE — 93000 ELECTROCARDIOGRAM COMPLETE: CPT | Performed by: INTERNAL MEDICINE

## 2024-09-10 PROCEDURE — G0008 ADMIN INFLUENZA VIRUS VAC: HCPCS | Performed by: INTERNAL MEDICINE

## 2024-09-10 PROCEDURE — 99214 OFFICE O/P EST MOD 30 MIN: CPT | Performed by: INTERNAL MEDICINE

## 2024-09-10 PROCEDURE — 90662 IIV NO PRSV INCREASED AG IM: CPT | Performed by: INTERNAL MEDICINE

## 2024-09-10 PROCEDURE — 3008F BODY MASS INDEX DOCD: CPT | Performed by: INTERNAL MEDICINE

## 2024-09-10 PROCEDURE — 1170F FXNL STATUS ASSESSED: CPT | Performed by: INTERNAL MEDICINE

## 2024-09-10 PROCEDURE — 1123F ACP DISCUSS/DSCN MKR DOCD: CPT | Performed by: INTERNAL MEDICINE

## 2024-09-10 PROCEDURE — 1159F MED LIST DOCD IN RCRD: CPT | Performed by: INTERNAL MEDICINE

## 2024-09-10 PROCEDURE — 1160F RVW MEDS BY RX/DR IN RCRD: CPT | Performed by: INTERNAL MEDICINE

## 2024-09-10 PROCEDURE — 75571 CT HRT W/O DYE W/CA TEST: CPT

## 2024-09-10 PROCEDURE — G0439 PPPS, SUBSEQ VISIT: HCPCS | Performed by: INTERNAL MEDICINE

## 2024-09-10 PROCEDURE — 1036F TOBACCO NON-USER: CPT | Performed by: INTERNAL MEDICINE

## 2024-09-10 RX ORDER — ERGOCALCIFEROL 1.25 MG/1
50000 CAPSULE ORAL WEEKLY
Qty: 12 CAPSULE | Refills: 3 | Status: SHIPPED | OUTPATIENT
Start: 2024-09-10 | End: 2025-08-12

## 2024-09-10 RX ORDER — ESOMEPRAZOLE MAGNESIUM 40 MG/1
40 CAPSULE, DELAYED RELEASE ORAL
Qty: 90 CAPSULE | Refills: 3 | Status: SHIPPED | OUTPATIENT
Start: 2024-09-10 | End: 2025-09-10

## 2024-09-10 RX ORDER — PNEUMOCOCCAL 20-VALENT CONJUGATE VACCINE 2.2; 2.2; 2.2; 2.2; 2.2; 2.2; 2.2; 2.2; 2.2; 2.2; 2.2; 2.2; 2.2; 2.2; 2.2; 2.2; 4.4; 2.2; 2.2; 2.2 UG/.5ML; UG/.5ML; UG/.5ML; UG/.5ML; UG/.5ML; UG/.5ML; UG/.5ML; UG/.5ML; UG/.5ML; UG/.5ML; UG/.5ML; UG/.5ML; UG/.5ML; UG/.5ML; UG/.5ML; UG/.5ML; UG/.5ML; UG/.5ML; UG/.5ML; UG/.5ML
0.5 INJECTION, SUSPENSION INTRAMUSCULAR ONCE
Qty: 0.5 ML | Refills: 0 | Status: SHIPPED | OUTPATIENT
Start: 2024-09-10 | End: 2024-09-10

## 2024-09-10 ASSESSMENT — ENCOUNTER SYMPTOMS
OCCASIONAL FEELINGS OF UNSTEADINESS: 0
LOSS OF SENSATION IN FEET: 0
DEPRESSION: 0

## 2024-09-10 ASSESSMENT — ACTIVITIES OF DAILY LIVING (ADL)
GROCERY_SHOPPING: INDEPENDENT
DRESSING: INDEPENDENT
MANAGING_FINANCES: INDEPENDENT
BATHING: INDEPENDENT
TAKING_MEDICATION: INDEPENDENT
DOING_HOUSEWORK: INDEPENDENT

## 2024-09-10 ASSESSMENT — PATIENT HEALTH QUESTIONNAIRE - PHQ9
2. FEELING DOWN, DEPRESSED OR HOPELESS: SEVERAL DAYS
1. LITTLE INTEREST OR PLEASURE IN DOING THINGS: NOT AT ALL
SUM OF ALL RESPONSES TO PHQ9 QUESTIONS 1 AND 2: 1

## 2024-09-10 NOTE — PROGRESS NOTES
"Subjective   Reason for Visit: Alize Castano is an 64 y.o. female here for a Medicare Wellness visit.          Reviewed all medications by prescribing practitioner or clinical pharmacist (such as prescriptions, OTCs, herbal therapies and supplements) and documented in the medical record.    Patient followed by Dr. Sánchez    Here today for a wellness visit after urgent morning scheduling change    Overall has felt well    She had a history of vertigo-that is doing a bit better presently    Right knee occasionally an issue-improved        Patient Care Team:  Jennifer Sánchez MD as PCP - General  Jennifer Sánchez MD [facility] PCP [facility]     Review of Systems    Current Outpatient Medications   Medication Instructions    ergocalciferol (VITAMIN D-2) 50,000 Units, oral, Weekly    esomeprazole (NEXIUM) 40 mg, oral, Daily before breakfast, Do not open capsule.    ezetimibe (ZETIA) 10 mg, oral, Every evening    FLUoxetine (PROzac) 20 mg tablet TAKE 2 TABLETS (40MG) ONCE DAILY    hydrocortisone 2.5 % cream Topical    ivermectin (Stromectol) 3 mg tablet Please compound to make 1 capsule of 12 mg to be taken daily during viral illness for 3 days at a time    meclizine (Antivert) 25 mg tablet 1 tablet, oral, 3 times daily PRN    ondansetron ODT (ZOFRAN-ODT) 8 mg, oral, Every 8 hours PRN    potassium chloride CR 10 mEq ER tablet 10 mEq, oral, Daily PRN    sod chloride-sodium bicarb (Nasal Wash) nasal rinse nasal    SUMAtriptan (IMITREX) 50 mg, oral, Once as needed, AT THE ONSET OF HEADACHE, MAY REPEAT IF NECESSARY TWO HOURS LATER    Synthroid 100 mcg, oral, Daily         Objective   Vitals:  /85   Pulse 69   Ht 1.727 m (5' 8\")   Wt 83 kg (183 lb)   BMI 27.83 kg/m²       Physical Exam  Vitals reviewed.   Constitutional:       Appearance: Normal appearance.   HENT:      Head: Normocephalic and atraumatic.   Eyes:      General: No scleral icterus.        Right eye: No discharge.         Left eye: No " discharge.      Extraocular Movements: Extraocular movements intact.      Conjunctiva/sclera: Conjunctivae normal.      Pupils: Pupils are equal, round, and reactive to light.   Cardiovascular:      Rate and Rhythm: Normal rate and regular rhythm.      Pulses: Normal pulses.      Heart sounds: Normal heart sounds. No murmur heard.  Pulmonary:      Effort: Pulmonary effort is normal.      Breath sounds: Normal breath sounds. No wheezing or rhonchi.   Musculoskeletal:         General: No deformity or signs of injury. Normal range of motion.      Cervical back: Normal range of motion and neck supple. No rigidity or tenderness.   Lymphadenopathy:      Cervical: No cervical adenopathy.   Skin:     General: Skin is warm and dry.      Findings: No rash.   Neurological:      General: No focal deficit present.      Mental Status: She is alert and oriented to person, place, and time. Mental status is at baseline.      Cranial Nerves: No cranial nerve deficit.      Sensory: No sensory deficit.      Gait: Gait normal.   Psychiatric:         Mood and Affect: Mood normal.         Behavior: Behavior normal.         Thought Content: Thought content normal.         Judgment: Judgment normal.         Assessment & Plan  Routine general medical examination at health care facility    Orders:    1 Year Follow Up In [facility] - PCP - Wellness Exam; Future    Vitamin D deficiency    Orders:    ergocalciferol (Vitamin D-2) 1.25 MG (99003 UT) capsule; Take 1 capsule (50,000 Units) by mouth 1 (one) time per week.    Hypothyroidism, unspecified type         Elevated blood pressure reading without diagnosis of hypertension    Orders:    ECG 12 lead (Clinic Performed)    Immunization due    Orders:    respiratory syncytial virus, RSV, vaccine, adjuvanted, age 60y+ (Arexvy) 120 mcg/0.5 mL suspension for reconstitution; Inject 0.5 mL into the muscle 1 time for 1 dose.    pneumoc 20-quynh conj-dip cr,PF, (Prevnar 20, PF,) 0.5 mL vaccine;  Inject 0.5 mL into the muscle 1 time for 1 dose. Give  0.5 ml once IM    Gastroesophageal reflux disease with esophagitis without hemorrhage    Orders:    esomeprazole (NexIUM) 40 mg DR capsule; Take 1 capsule (40 mg) by mouth once daily in the morning. Take before meals. Do not open capsule.    Dyslipidemia, goal LDL below 100         Elevated blood pressure reading         Statin intolerance            Lab on 09/05/2024   Component Date Value Ref Range Status    WBC 09/05/2024 6.2  4.4 - 11.3 x10*3/uL Final    nRBC 09/05/2024 0.0  0.0 - 0.0 /100 WBCs Final    RBC 09/05/2024 4.50  4.00 - 5.20 x10*6/uL Final    Hemoglobin 09/05/2024 14.2  12.0 - 16.0 g/dL Final    Hematocrit 09/05/2024 42.0  36.0 - 46.0 % Final    MCV 09/05/2024 93  80 - 100 fL Final    MCH 09/05/2024 31.6  26.0 - 34.0 pg Final    MCHC 09/05/2024 33.8  32.0 - 36.0 g/dL Final    RDW 09/05/2024 11.9  11.5 - 14.5 % Final    Platelets 09/05/2024 220  150 - 450 x10*3/uL Final    Glucose 09/05/2024 89  74 - 99 mg/dL Final    Sodium 09/05/2024 139  136 - 145 mmol/L Final    Potassium 09/05/2024 3.9  3.5 - 5.3 mmol/L Final    Chloride 09/05/2024 105  98 - 107 mmol/L Final    Bicarbonate 09/05/2024 28  21 - 32 mmol/L Final    Anion Gap 09/05/2024 10  10 - 20 mmol/L Final    Urea Nitrogen 09/05/2024 11  6 - 23 mg/dL Final    Creatinine 09/05/2024 0.78  0.50 - 1.05 mg/dL Final    eGFR 09/05/2024 84  >60 mL/min/1.73m*2 Final    Calculations of estimated GFR are performed using the 2021 CKD-EPI Study Refit equation without the race variable for the IDMS-Traceable creatinine methods.  https://jasn.asnjournals.org/content/early/2021/09/22/ASN.7230953240    Calcium 09/05/2024 9.5  8.6 - 10.6 mg/dL Final    Albumin 09/05/2024 4.3  3.4 - 5.0 g/dL Final    Alkaline Phosphatase 09/05/2024 75  33 - 136 U/L Final    Total Protein 09/05/2024 6.8  6.4 - 8.2 g/dL Final    AST 09/05/2024 15  9 - 39 U/L Final    Bilirubin, Total 09/05/2024 0.7  0.0 - 1.2 mg/dL Final    ALT  09/05/2024 20  7 - 45 U/L Final    Patients treated with Sulfasalazine may generate falsely decreased results for ALT.    Cholesterol 09/05/2024 268 (H)  0 - 199 mg/dL Final          Age      Desirable   Borderline High   High     0-19 Y     0 - 169       170 - 199     >/= 200    20-24 Y     0 - 189       190 - 224     >/= 225         >24 Y     0 - 199       200 - 239     >/= 240   **All ranges are based on fasting samples. Specific   therapeutic targets will vary based on patient-specific   cardiac risk.    Pediatric guidelines reference:Pediatrics 2011, 128(S5).Adult guidelines reference: NCEP ATPIII Guidelines,SHIRA 2001, 258:2486-97    Venipuncture immediately after or during the administration of Metamizole may lead to falsely low results. Testing should be performed immediately prior to Metamizole dosing.    HDL-Cholesterol 09/05/2024 71.7  mg/dL Final      Age       Very Low   Low     Normal    High    0-19 Y    < 35      < 40     40-45     ----  20-24 Y    ----     < 40      >45      ----        >24 Y      ----     < 40     40-60      >60      Cholesterol/HDL Ratio 09/05/2024 3.7   Final      Ref Values  Desirable  < 3.4  High Risk  > 5.0    LDL Calculated 09/05/2024 172 (H)  <=99 mg/dL Final                                Near   Borderline      AGE      Desirable  Optimal    High     High     Very High     0-19 Y     0 - 109     ---    110-129   >/= 130     ----    20-24 Y     0 - 119     ---    120-159   >/= 160     ----      >24 Y     0 -  99   100-129  130-159   160-189     >/=190      VLDL 09/05/2024 24  0 - 40 mg/dL Final    Triglycerides 09/05/2024 121  0 - 149 mg/dL Final       Age         Desirable   Borderline High   High     Very High   0 D-90 D    19 - 174         ----         ----        ----  91 D- 9 Y     0 -  74        75 -  99     >/= 100      ----    10-19 Y     0 -  89        90 - 129     >/= 130      ----    20-24 Y     0 - 114       115 - 149     >/= 150      ----         >24 Y     0 -  149       150 - 199    200- 499    >/= 500    Venipuncture immediately after or during the administration of Metamizole may lead to falsely low results. Testing should be performed immediately prior to Metamizole dosing.    Non HDL Cholesterol 09/05/2024 196 (H)  0 - 149 mg/dL Final          Age       Desirable   Borderline High   High     Very High     0-19 Y     0 - 119       120 - 144     >/= 145    >/= 160    20-24 Y     0 - 149       150 - 189     >/= 190      ----         >24 Y    30 mg/dL above LDL Cholesterol goal      Thyroid Stimulating Hormone 09/05/2024 0.89  0.44 - 3.98 mIU/L Final    Triiodothyronine, Free 09/05/2024 3.0  2.3 - 4.2 pg/mL Final    Vitamin D, 25-Hydroxy, Total 09/05/2024 80  30 - 100 ng/mL Final       === 09/10/24 ===    CT CARDIAC SCORING WO IV CONTRAST    Addendum 9/11/2024  8:31 PM ------------------------------------------------  Interpreted By:  Peña Amor,  ADDENDUM:  Technical: The following is to serve as an over-read for an  unenhanced cardiac CT, to evaluate the extravascular structures.    Contiguous unenhanced CT sections are performed from the level the  joyce to the upper abdomen.        Findings: The visualized portions of both lungs are clear aside from  calcified areas of linear scarring in the right middle lobe.    Calcified granuloma are identified within the right hilum there is no  sign of pathologic lymph node enlargement. There is no pericardial or  pleural effusion.    Images through the upper abdomen are unremarkable.    The visualized osseous structures are intact. There is a 5 mm bone  island in a lower thoracic vertebra anteriorly.        Impression: The extravascular structures have an unremarkable CT  appearance.    Signed by: Peña Amor 9/11/2024 8:31 PM    -------- ORIGINAL REPORT --------  Dictation workstation:   [id number]    - Impression -  1. Coronary artery calcium score of 1*.  2. CRAMER 50th percentile** for age, gender, and  race in asymptomatic  patients.      *Coronary Artery  Agatston score    Score risk  Very low 1-99  Mildly increased 100-299  Moderately increased >300  Moderate to severely increased >800    Belinda et al. JCCT 2016 (http://dx.doi.org/10.1016/j.jcct.2016.11.003)    ** QUEZADA Percentile  In general, greater than 75th percentile for age, gender, and race is  considered to be a higher relative risk and higher lifetime risk  condition. Greater than 75th percentile=moderate to severely  increased relative risk irrespective of the score. Advise using QUEZADA  10 year CHD risk calculator below for better discrimination of risk.    QUEZADA 10-Year CHD Risk with Coronary Artery Calcification can be  calcuate using link below  [email or web address]  Linda rivera al. JACC 2015 (http://dx.doi.org/10.1016/j.j  acc.2015.08.035)    Reading Cardiologist: Dr. Peña Phillips, Date: 9/11/2024 5:59 pm    Signed by: Peña Phillips 9/11/2024 6:00 PM  Dictation workstation:   [id number]         Elevated blood pressure-slightly elevated systolic pressure at 132.  Improved on recheck.  Will continue to follow    Dyslipidemia/elevated weight-intolerant to statins.  Goal LDL under 100.           9/24-LDL elevated at 172.  Fortunately her calcium score in 9/24 was quite favorable with a calcium score of 1.  BMI 27.8.  ASCVD risk score only 6.8%.  She will continue healthy diet and exercise routine as tolerated will continue to follow on Zetia.    Vitamin D concern-this was elevated in the past-          9/24-vitamin D normal at 80.  She will continue her present replacement plan    Hypothyroidism-we'll continue to follow thyroid labs with present thyroid supplement prescription.    Acid reflux-stable/controlled with Nexium morning    Colon cancer screening-colonoscopy updated June 2021 next recommended 7 years-June 2028.    Annual mammogram-scheduled soon    Bone density-scheduled soon-bone density was  normal when last done April 2019.    History of migraines-she has used Imitrex as needed.    Anxiety-she remains on fluoxetine daily which has been helpful    Dental care-encouraged semiannual dental visits.    Vision care-she will continue annually    Vertigo-she follows with Dr. Matthews annually each July.  Doing well.  She will continue meclizine as needed    Sleep apnea-she will continue present treatment plan    Right knee-occasionally sore-she will use caution on uneven ground and stairs    Caregiving concerns-her mother is 91.  Though she lives with her daughter, her other sister and she has 4 sisters, she is the only one that is retired so she is the one that is helping with housework , [employer]Br[employer] Visits and paying the bills.  Support provided    Flu shot-provided 9/10/2024-today    COVID booster-encouraged this month    RSV vaccination-she will consider getting this at her pharmacy    Prevnar 20-she will get this at her pharmacy    Follow-up w/ Dr. Sánchez, sooner with concerns    Charting was completed using voice recognition technology and may include unintended errors.

## 2024-09-10 NOTE — ASSESSMENT & PLAN NOTE
Orders:    esomeprazole (NexIUM) 40 mg DR capsule; Take 1 capsule (40 mg) by mouth once daily in the morning. Take before meals. Do not open capsule.

## 2024-09-10 NOTE — ASSESSMENT & PLAN NOTE
Orders:    ergocalciferol (Vitamin D-2) 1.25 MG (98356 UT) capsule; Take 1 capsule (50,000 Units) by mouth 1 (one) time per week.     Doxycycline Counseling:  Patient counseled regarding possible photosensitivity and increased risk for sunburn.  Patient instructed to avoid sunlight, if possible.  When exposed to sunlight, patients should wear protective clothing, sunglasses, and sunscreen.  The patient was instructed to call the office immediately if the following severe adverse effects occur:  hearing changes, easy bruising/bleeding, severe headache, or vision changes.  The patient verbalized understanding of the proper use and possible adverse effects of doxycycline.  All of the patient's questions and concerns were addressed.

## 2024-09-11 NOTE — RESULT ENCOUNTER NOTE
Alize    Thank you for doing the CT calcium score.  I am very pleased that the radiologist notes that your calcium score is very low at 1.  This makes the likelihood of underlying coronary artery disease quite low.    Once again thanks for doing this important cardiac screening test.    Sincerely,  You Colón MD -covering physician for Dr. Sánchez

## 2024-09-12 VITALS
HEART RATE: 69 BPM | HEIGHT: 68 IN | BODY MASS INDEX: 27.74 KG/M2 | SYSTOLIC BLOOD PRESSURE: 126 MMHG | DIASTOLIC BLOOD PRESSURE: 80 MMHG | WEIGHT: 183 LBS

## 2024-09-12 PROBLEM — Z78.9 STATIN INTOLERANCE: Status: ACTIVE | Noted: 2024-09-12

## 2024-09-12 PROBLEM — R03.0 ELEVATED BLOOD PRESSURE READING: Status: ACTIVE | Noted: 2024-09-12

## 2024-09-12 ASSESSMENT — ACTIVITIES OF DAILY LIVING (ADL)
GROOMING: INDEPENDENT
TOILETING: INDEPENDENT
FEEDING YOURSELF: INDEPENDENT
ADEQUATE_TO_COMPLETE_ADL: YES
ASSISTIVE_DEVICE: EYEGLASSES
JUDGMENT_ADEQUATE_SAFELY_COMPLETE_DAILY_ACTIVITIES: YES
PATIENT'S MEMORY ADEQUATE TO SAFELY COMPLETE DAILY ACTIVITIES?: YES

## 2024-09-22 ENCOUNTER — HOSPITAL ENCOUNTER (EMERGENCY)
Facility: HOSPITAL | Age: 64
Discharge: HOME | End: 2024-09-23
Attending: EMERGENCY MEDICINE
Payer: MEDICARE

## 2024-09-22 DIAGNOSIS — E87.6 HYPOKALEMIA: ICD-10-CM

## 2024-09-22 DIAGNOSIS — R10.84 GENERALIZED ABDOMINAL PAIN: Primary | ICD-10-CM

## 2024-09-22 DIAGNOSIS — R11.2 NAUSEA AND VOMITING, UNSPECIFIED VOMITING TYPE: ICD-10-CM

## 2024-09-22 PROCEDURE — 99285 EMERGENCY DEPT VISIT HI MDM: CPT | Mod: 25

## 2024-09-22 RX ORDER — ONDANSETRON HYDROCHLORIDE 2 MG/ML
4 INJECTION, SOLUTION INTRAVENOUS ONCE
Status: COMPLETED | OUTPATIENT
Start: 2024-09-23 | End: 2024-09-23

## 2024-09-22 ASSESSMENT — COLUMBIA-SUICIDE SEVERITY RATING SCALE - C-SSRS
6. HAVE YOU EVER DONE ANYTHING, STARTED TO DO ANYTHING, OR PREPARED TO DO ANYTHING TO END YOUR LIFE?: NO
2. HAVE YOU ACTUALLY HAD ANY THOUGHTS OF KILLING YOURSELF?: NO
1. IN THE PAST MONTH, HAVE YOU WISHED YOU WERE DEAD OR WISHED YOU COULD GO TO SLEEP AND NOT WAKE UP?: NO

## 2024-09-22 ASSESSMENT — PAIN DESCRIPTION - DESCRIPTORS: DESCRIPTORS: CRAMPING

## 2024-09-22 ASSESSMENT — PAIN - FUNCTIONAL ASSESSMENT: PAIN_FUNCTIONAL_ASSESSMENT: 0-10

## 2024-09-22 ASSESSMENT — PAIN DESCRIPTION - LOCATION: LOCATION: ABDOMEN

## 2024-09-22 ASSESSMENT — PAIN DESCRIPTION - PAIN TYPE: TYPE: ACUTE PAIN

## 2024-09-22 ASSESSMENT — PAIN DESCRIPTION - ORIENTATION: ORIENTATION: UPPER

## 2024-09-22 ASSESSMENT — PAIN SCALES - GENERAL: PAINLEVEL_OUTOF10: 2

## 2024-09-23 ENCOUNTER — APPOINTMENT (OUTPATIENT)
Dept: CARDIOLOGY | Facility: HOSPITAL | Age: 64
End: 2024-09-23
Payer: MEDICARE

## 2024-09-23 ENCOUNTER — APPOINTMENT (OUTPATIENT)
Dept: RADIOLOGY | Facility: HOSPITAL | Age: 64
End: 2024-09-23
Payer: MEDICARE

## 2024-09-23 VITALS
RESPIRATION RATE: 17 BRPM | SYSTOLIC BLOOD PRESSURE: 140 MMHG | HEART RATE: 76 BPM | BODY MASS INDEX: 27.28 KG/M2 | WEIGHT: 180 LBS | HEIGHT: 68 IN | TEMPERATURE: 96.8 F | DIASTOLIC BLOOD PRESSURE: 83 MMHG | OXYGEN SATURATION: 95 %

## 2024-09-23 LAB
ALBUMIN SERPL BCP-MCNC: 4.2 G/DL (ref 3.4–5)
ALP SERPL-CCNC: 60 U/L (ref 33–136)
ALT SERPL W P-5'-P-CCNC: 25 U/L (ref 7–45)
ANION GAP SERPL CALC-SCNC: 12 MMOL/L (ref 10–20)
APPEARANCE UR: CLEAR
AST SERPL W P-5'-P-CCNC: 21 U/L (ref 9–39)
ATRIAL RATE: 69 BPM
BASOPHILS # BLD AUTO: 0.03 X10*3/UL (ref 0–0.1)
BASOPHILS NFR BLD AUTO: 0.3 %
BILIRUB SERPL-MCNC: 0.5 MG/DL (ref 0–1.2)
BILIRUB UR STRIP.AUTO-MCNC: NEGATIVE MG/DL
BUN SERPL-MCNC: 16 MG/DL (ref 6–23)
CALCIUM SERPL-MCNC: 8.6 MG/DL (ref 8.6–10.3)
CARDIAC TROPONIN I PNL SERPL HS: 3 NG/L (ref 0–13)
CHLORIDE SERPL-SCNC: 111 MMOL/L (ref 98–107)
CO2 SERPL-SCNC: 18 MMOL/L (ref 21–32)
COLOR UR: ABNORMAL
CREAT SERPL-MCNC: 0.7 MG/DL (ref 0.5–1.05)
EGFRCR SERPLBLD CKD-EPI 2021: >90 ML/MIN/1.73M*2
EOSINOPHIL # BLD AUTO: 0.13 X10*3/UL (ref 0–0.7)
EOSINOPHIL NFR BLD AUTO: 1.3 %
ERYTHROCYTE [DISTWIDTH] IN BLOOD BY AUTOMATED COUNT: 11.9 % (ref 11.5–14.5)
GLUCOSE SERPL-MCNC: 113 MG/DL (ref 74–99)
GLUCOSE UR STRIP.AUTO-MCNC: NORMAL MG/DL
HCT VFR BLD AUTO: 46.9 % (ref 36–46)
HGB BLD-MCNC: 16.1 G/DL (ref 12–16)
HOLD SPECIMEN: NORMAL
IMM GRANULOCYTES # BLD AUTO: 0.05 X10*3/UL (ref 0–0.7)
IMM GRANULOCYTES NFR BLD AUTO: 0.5 % (ref 0–0.9)
KETONES UR STRIP.AUTO-MCNC: NEGATIVE MG/DL
LACTATE SERPL-SCNC: 1.2 MMOL/L (ref 0.4–2)
LEUKOCYTE ESTERASE UR QL STRIP.AUTO: NEGATIVE
LIPASE SERPL-CCNC: 27 U/L (ref 9–82)
LYMPHOCYTES # BLD AUTO: 2.15 X10*3/UL (ref 1.2–4.8)
LYMPHOCYTES NFR BLD AUTO: 22.2 %
MAGNESIUM SERPL-MCNC: 1.68 MG/DL (ref 1.6–2.4)
MCH RBC QN AUTO: 32.2 PG (ref 26–34)
MCHC RBC AUTO-ENTMCNC: 34.3 G/DL (ref 32–36)
MCV RBC AUTO: 94 FL (ref 80–100)
MONOCYTES # BLD AUTO: 0.96 X10*3/UL (ref 0.1–1)
MONOCYTES NFR BLD AUTO: 9.9 %
NEUTROPHILS # BLD AUTO: 6.37 X10*3/UL (ref 1.2–7.7)
NEUTROPHILS NFR BLD AUTO: 65.8 %
NITRITE UR QL STRIP.AUTO: NEGATIVE
NRBC BLD-RTO: 0 /100 WBCS (ref 0–0)
P AXIS: 55 DEGREES
P OFFSET: 194 MS
P ONSET: 134 MS
PH UR STRIP.AUTO: 5.5 [PH]
PLATELET # BLD AUTO: 248 X10*3/UL (ref 150–450)
POTASSIUM SERPL-SCNC: 3.2 MMOL/L (ref 3.5–5.3)
PR INTERVAL: 178 MS
PROT SERPL-MCNC: 6.7 G/DL (ref 6.4–8.2)
PROT UR STRIP.AUTO-MCNC: NEGATIVE MG/DL
Q ONSET: 223 MS
QRS COUNT: 12 BEATS
QRS DURATION: 94 MS
QT INTERVAL: 456 MS
QTC CALCULATION(BAZETT): 488 MS
QTC FREDERICIA: 478 MS
R AXIS: 23 DEGREES
RBC # BLD AUTO: 5 X10*6/UL (ref 4–5.2)
RBC # UR STRIP.AUTO: NEGATIVE /UL
SODIUM SERPL-SCNC: 138 MMOL/L (ref 136–145)
SP GR UR STRIP.AUTO: 1.05
T AXIS: 49 DEGREES
T OFFSET: 451 MS
UROBILINOGEN UR STRIP.AUTO-MCNC: NORMAL MG/DL
VENTRICULAR RATE: 69 BPM
WBC # BLD AUTO: 9.7 X10*3/UL (ref 4.4–11.3)

## 2024-09-23 PROCEDURE — 2550000001 HC RX 255 CONTRASTS: Performed by: EMERGENCY MEDICINE

## 2024-09-23 PROCEDURE — 74177 CT ABD & PELVIS W/CONTRAST: CPT | Performed by: RADIOLOGY

## 2024-09-23 PROCEDURE — 2500000004 HC RX 250 GENERAL PHARMACY W/ HCPCS (ALT 636 FOR OP/ED): Performed by: EMERGENCY MEDICINE

## 2024-09-23 PROCEDURE — 93005 ELECTROCARDIOGRAM TRACING: CPT

## 2024-09-23 PROCEDURE — 74177 CT ABD & PELVIS W/CONTRAST: CPT

## 2024-09-23 PROCEDURE — 83605 ASSAY OF LACTIC ACID: CPT | Performed by: EMERGENCY MEDICINE

## 2024-09-23 PROCEDURE — 84484 ASSAY OF TROPONIN QUANT: CPT | Performed by: EMERGENCY MEDICINE

## 2024-09-23 PROCEDURE — 2500000002 HC RX 250 W HCPCS SELF ADMINISTERED DRUGS (ALT 637 FOR MEDICARE OP, ALT 636 FOR OP/ED): Performed by: EMERGENCY MEDICINE

## 2024-09-23 PROCEDURE — 83690 ASSAY OF LIPASE: CPT | Performed by: EMERGENCY MEDICINE

## 2024-09-23 PROCEDURE — 96361 HYDRATE IV INFUSION ADD-ON: CPT

## 2024-09-23 PROCEDURE — 80053 COMPREHEN METABOLIC PANEL: CPT | Performed by: EMERGENCY MEDICINE

## 2024-09-23 PROCEDURE — 85025 COMPLETE CBC W/AUTO DIFF WBC: CPT | Performed by: EMERGENCY MEDICINE

## 2024-09-23 PROCEDURE — 83735 ASSAY OF MAGNESIUM: CPT | Performed by: EMERGENCY MEDICINE

## 2024-09-23 PROCEDURE — 81003 URINALYSIS AUTO W/O SCOPE: CPT | Performed by: EMERGENCY MEDICINE

## 2024-09-23 PROCEDURE — 36415 COLL VENOUS BLD VENIPUNCTURE: CPT | Performed by: EMERGENCY MEDICINE

## 2024-09-23 PROCEDURE — 96374 THER/PROPH/DIAG INJ IV PUSH: CPT

## 2024-09-23 RX ORDER — POTASSIUM CHLORIDE 20 MEQ/1
40 TABLET, EXTENDED RELEASE ORAL ONCE
Status: COMPLETED | OUTPATIENT
Start: 2024-09-23 | End: 2024-09-23

## 2024-09-23 RX ORDER — ONDANSETRON 4 MG/1
4 TABLET, ORALLY DISINTEGRATING ORAL EVERY 8 HOURS PRN
Qty: 20 TABLET | Refills: 0 | Status: SHIPPED | OUTPATIENT
Start: 2024-09-23 | End: 2024-09-26 | Stop reason: SDUPTHER

## 2024-09-23 ASSESSMENT — LIFESTYLE VARIABLES
EVER HAD A DRINK FIRST THING IN THE MORNING TO STEADY YOUR NERVES TO GET RID OF A HANGOVER: NO
TOTAL SCORE: 0
HAVE YOU EVER FELT YOU SHOULD CUT DOWN ON YOUR DRINKING: NO
EVER FELT BAD OR GUILTY ABOUT YOUR DRINKING: NO
HAVE PEOPLE ANNOYED YOU BY CRITICIZING YOUR DRINKING: NO

## 2024-09-23 ASSESSMENT — PAIN SCALES - GENERAL
PAINLEVEL_OUTOF10: 0 - NO PAIN

## 2024-09-23 NOTE — DISCHARGE INSTRUCTIONS
You may continue to take Tylenol as needed for your abdominal pain.  Use the Zofran as needed for your nausea.  Follow-up with your primary care doctor in 2 to 3 days.  Return to the emergency department for vomiting despite the Zofran, worsening or changing abdominal pain, or you have any questions or concerns.

## 2024-09-23 NOTE — ED PROVIDER NOTES
HPI   No chief complaint on file.      This is a 64 years old female patient brought into the emergency department by squad from home with a chief complaint of nausea, vomiting, diarrhea, and abdominal pain.  Stated that her symptoms started earlier in the morning, she had multiple episodes of nonbloody and nonmelanotic diarrhea, started to vomit an hour prior to arrival to the hospital, nonbloody vomiting.  Stated that the pain started to get better after vomiting and currently is 2 out of 10 in severity and does not want any pain medication for it.  Denies chest pain, shortness of breath, cough, weakness, focal numbness, headache, lightheadedness, dizziness, urinary symptoms.  Stated that she had cholecystectomy in the past.    Review of system: As stated above in the HPI section.            Patient History   Past Medical History:   Diagnosis Date    Acute pharyngitis, unspecified 04/23/2015    Acute viral pharyngitis    Acute tonsillitis, unspecified 10/01/2019    Tonsillitis with exudate    Acute upper respiratory infection, unspecified 11/27/2019    Acute upper respiratory infection    Contact with and (suspected) exposure to other viral communicable diseases 12/23/2019    Exposure to influenza    Cough, unspecified 04/23/2015    Cough    Cramp and spasm 03/11/2016    Calf cramp    Disorientation, unspecified 05/17/2021    Confusion and disorientation    Diverticulosis of intestine, part unspecified, without perforation or abscess without bleeding     Diverticulosis    Other abnormal and inconclusive findings on diagnostic imaging of breast 11/18/2015    Abnormal mammogram    Other lack of coordination 08/30/2018    Decreased coordination    Pain in arm, unspecified 01/17/2014    Arm pain    Personal history of diseases of the skin and subcutaneous tissue 12/02/2014    History of eczema    Personal history of other diseases of the female genital tract 06/20/2013    History of dysfunctional uterine bleeding     Personal history of other diseases of the female genital tract 2013    History of ovarian cyst    Personal history of other diseases of the respiratory system     History of sore throat    Personal history of other diseases of the respiratory system 2018    History of acute bronchitis    Personal history of other specified conditions 2018    History of vertigo    Personal history of other specified conditions 2014    History of diarrhea    Personal history of urinary (tract) infections 2013    History of acute cystitis    Sleep apnea, unspecified 2018    Sleep apnea in adult    Strain of muscle, fascia and tendon of lower back, initial encounter 2016    Strain of lumbar region, initial encounter    Strain of muscle, fascia and tendon of lower back, initial encounter 06/15/2016    Strain of lumbar region, initial encounter    Vitamin deficiency, unspecified 2015    Vitamin deficiency     Past Surgical History:   Procedure Laterality Date    BREAST SURGERY  2014    Breast Surgery Reduction Procedure    CHOLECYSTECTOMY  2016    Cholecystectomy    COLONOSCOPY  2015    Complete Colonoscopy    ESOPHAGOGASTRODUODENOSCOPY  2016    Diagnostic Esophagogastroduodenoscopy    OTHER SURGICAL HISTORY  2013    Wedge Resection Of Lung     Family History   Problem Relation Name Age of Onset    Other (cardiac stent) Mother          2 cardiac stents    Other (rectal bleeding) Mother      Other (recurrent utis) Mother      Heart attack Father      Alcohol abuse Father      Dementia Father      Heart disease Father      Transient ischemic attack Father      Lymphoma Father           at 81    Irritable bowel syndrome Sister      Other (stomach disorder) Sister      Irritable bowel syndrome Sister      Irritable bowel syndrome Sister      Irritable bowel syndrome Sister      Heart attack Mother's Sister      Heart attack Mother's Brother      Heart attack  Maternal Grandmother      Heart attack Maternal Grandfather       Social History     Tobacco Use    Smoking status: Never    Smokeless tobacco: Never   Substance Use Topics    Alcohol use: Yes     Comment: occasional    Drug use: Never       Physical Exam   ED Triage Vitals   Temp Pulse Resp BP   -- -- -- --      SpO2 Temp src Heart Rate Source Patient Position   -- -- -- --      BP Location FiO2 (%)     -- --       Physical Exam  Vitals and nursing note reviewed.   Constitutional:       General: She is not in acute distress.     Appearance: She is well-developed.   HENT:      Head: Normocephalic and atraumatic.   Eyes:      Conjunctiva/sclera: Conjunctivae normal.   Cardiovascular:      Rate and Rhythm: Normal rate and regular rhythm.      Heart sounds: No murmur heard.  Pulmonary:      Effort: Pulmonary effort is normal. No respiratory distress.      Breath sounds: Normal breath sounds.   Abdominal:      General: There is distension.      Palpations: Abdomen is soft.      Tenderness: There is abdominal tenderness.      Comments: Diffuse nonspecific abdominal tenderness and distention, no guarding, rigidity, rebound   Musculoskeletal:         General: No swelling.      Cervical back: Neck supple.   Skin:     General: Skin is warm and dry.      Capillary Refill: Capillary refill takes less than 2 seconds.   Neurological:      General: No focal deficit present.      Mental Status: She is alert and oriented to person, place, and time.   Psychiatric:         Mood and Affect: Mood normal.       ED Course & MDM   Diagnoses as of 09/23/24 0254   Generalized abdominal pain   Nausea and vomiting, unspecified vomiting type   Hypokalemia                 No data recorded                                 Medical Decision Making  Patient seen and examined, will obtain basic labs, abdominal pain workup including CT abdomen pelvis with IV contrast dye.  Presentation is concerning for bowel obstruction versus gastroenteritis.  Will  administer Zofran, IV fluids, will reassess the patient symptoms.  CT scan is unremarkable for obstruction and revealed likely finding related to enteritis.  Hypokalemia is replenished.  Patient is discharged home to increase oral fluid intake, discharged on Zofran with instruction to take Motrin/Tylenol for pain control and to return to the ED if alarming symptoms arise as per discharge instructions specially if she fails Zofran as an outpatient, worsening abdominal pain, lightheadedness, dizziness or fever        Procedure  Procedures     Gregorio Brito, DO  09/23/24 0251

## 2024-09-25 ENCOUNTER — HOSPITAL ENCOUNTER (OUTPATIENT)
Dept: RADIOLOGY | Facility: CLINIC | Age: 64
Discharge: HOME | End: 2024-09-25
Payer: MEDICARE

## 2024-09-25 DIAGNOSIS — Z78.0 ASYMPTOMATIC MENOPAUSAL STATE: ICD-10-CM

## 2024-09-25 NOTE — PROGRESS NOTES
Subjective   Patient ID: Alize Castano is a 64 y.o. female who presents for follow up ED visit, Seen in the ED 9/22/2024 for n,v,d and abd pain         She started with diarrhea on 9/22/24 then started with emesis.  She has an appt with Dr Watson on 12/5/2024    She ate a hamburger on Friday and popcorn on Saturday and when she was belching on Sunday she could taste both  She stopped taking potassium chloride due to it upsetting her stomach  She started taking juice plus in 8/24 and she had a period of diarrhea for 24 hours  She complains of being very cold where she is normally hot  She started taking Vitamin B complex and that helps her Sx   She has been riding her bike 3-4 times a week except the past month  She is getting 6- 8 hours of sleep.  She has been able to eat baby food, chicken noodle soup, toast yogurt   She is eating vegetable broth    She has not had a BM since 9/22/24 but she has not been eating either        HEALTH:  PAP 2013, 2/18 - and no longer need to repeat   Mammo 10/18, 9/20, 11/21, 11/22, ordered 8/2023  BD 9/12, 4/19 T-0.9, ordered 8/2023  Colon 2008, 7/15 with Dr Dixon, 6/21 nl and Q 7, will repeat in 2028  Ca cardiac score 6/12 was ZERO, 9/24 score is 1.   EKG 5/13, 12/15, 1/18, 6/20, 6/21, 2/23, 8/23, 9/24   U/A 12/14, 12/15, 1/17, 2/18, 3/19, 6/20, 8/21   Hep C negative 11/2021   FLU  9/17, 10/18, 9/19, 9/20, 10/21, 11/22, 9/24   TDAP 2008 , 5/19  Prevnar 20 recommended but wants to wait 8/2023  Pneumovax never   Zostavax 5/15    Shingrix 6/23/2020 and 9/22/2020  Pfizer CVD vaccine 3/6/2021 and 3/27/2021 and declines more   Ophth She wears glasses. No glaucoma or MD. She has the beginnings of cataracts that is being observed for now.         Review of Systems  All systems negative except those listed in the HPI       Objective   Visit Vitals  /80 (BP Location: Left arm, Patient Position: Sitting)   Pulse 70   Temp 36.7 °C (98 °F) (Temporal)    Body mass index is 26.61 kg/m².       Physical Exam  Vitals reviewed.   Constitutional:       Appearance: Normal appearance.   HENT:      Head: Normocephalic.      Right Ear: Tympanic membrane, ear canal and external ear normal.      Left Ear: Tympanic membrane, ear canal and external ear normal.      Nose: Nose normal.      Mouth/Throat:      Pharynx: Oropharynx is clear.   Eyes:      Conjunctiva/sclera: Conjunctivae normal.   Cardiovascular:      Rate and Rhythm: Normal rate and regular rhythm.      Pulses: Normal pulses.      Heart sounds: Normal heart sounds.   Pulmonary:      Effort: Pulmonary effort is normal.      Breath sounds: Normal breath sounds.   Abdominal:      General: Bowel sounds are normal.      Palpations: Abdomen is soft.      Comments: Hypoactive BS   Musculoskeletal:         General: Normal range of motion.      Cervical back: Normal range of motion and neck supple.   Skin:     General: Skin is warm.   Neurological:      General: No focal deficit present.      Mental Status: She is alert and oriented to person, place, and time.   Psychiatric:         Mood and Affect: Mood normal.         Behavior: Behavior normal.         Thought Content: Thought content normal.         Judgment: Judgment normal.       Assessment/Plan   Problem List Items Addressed This Visit       Dyslipidemia, goal LDL below 100    Elevated blood pressure reading    Enteritis - Primary    GERD (gastroesophageal reflux disease)    Hypokalemia    Hypothyroidism    Statin intolerance     Other Visit Diagnoses       Generalized abdominal pain        Nausea and vomiting, unspecified vomiting type               Hospital follow up completed  Hospital notes reviewed and medication reconciliation performed with patient  Reviewed labs and imaging from the hospital with patient   Reviewed her labs from 9/24     She has no children and is in a committed relationship.   She denies previous history of tobacco use  She is a retired . She still plays softball in the  summer   Her mom is 90 + y/o. Her dad passed away in 2/17 from lymphoma      She started with diarrhea on 9/22/24 then started with emesis.  She ate a hamburger on Friday and popcorn on Saturday and when she was belching on Sunday she could taste both  She stopped taking potassium chloride due to it upsetting her stomach  She started taking juice plus in 8/24 and she had a period of diarrhea for 24 hours  She complains of being very cold where she is normally hot  She started taking Vitamin B complex and that helps her Sx   She has been riding her bike 3-4 times a week except the past month  She is getting 6- 8 hours of sleep.  She has been able to eat baby food, chicken noodle soup, toast yogurt    She is eating vegetable broth   She has not had a BM since 9/22/24 but she has not been eating either   She has an appt with Dr Watson on 12/5/2024. She would like a sooner appt - I will reach out to Dr Watson's office to see if we can get the patient in sooner 9/24  Colonoscopy 6/17/2021 normal and Q 7     -- Food to try and foods to avoid discussed in detail with patient 9/24  -- Recommend she eat small amounts Q 1 hour   -- Recommend taking Miralax 1 scoop daily to help with constipation   -- Continue Zofran 4 mg prn   -- Recommend drinking Liquid IV in water to replenish electrolytes   -- Continue Nexium 40 mg daily   -- Explained it will take 8 weeks for her to feel back to normal  She can walk but no working out for 8 weeks     Seen in the ED 9/22/2024 for enteritis:   EKG 9/24 NSR   CT scan 9/24 negative for obstruction and revealed likely finding related to enteritis.   Hypokalemia is replenished. Patient is discharged home to increase oral fluid intake, discharged on Zofran with instruction to take Motrin/Tylenol for pain control    She has an appt with Dr Watson on 12/5/2024      She saw Dr Mcdonald on 8/23/18 for hearing evaluation   Ruled out Menieres disease. She has intermittent tinnitus   Continue  vitamin B2 2-3 times a week and Rx given for Antivert.   She saw Dr Ozuna on 9/24/19. She has sensorineural hearing loss of the right ear with unrestricted hearing of left ear.   Recommend she follow with Evonne Ozuna CNP and annual audiological evaluation     Her weight in office is 175 with BMI of 26.61. We spent 15 minutes discussing diet and weight loss. The struggle of weight loss persists   Explained goal for BMI to be 25 or less      HTN-    She stopped Triamterene/HCTZ 37.5/25 mg.   She can stay off the diuretic for now.   EKG 9/24 NSR   ECHO 2/2023 LV systolic function normal with EF 65 -70%   Stress test 4/2023 no evidence for inducible ischemia or previous myocardial scarring, with normal left and right ventricular size and systolic function and preserved EF  Recommend she monitor her BP at home and call with elevated readings   She saw Dr Delarosa in 4/2023 and only needs to follow prn         I have spent 15 min face to face with this patient discussing their cardiac risk   We discussed the patients cardiovascular risk. If needed, lifestyle modifications recommended including: behavioral therapies of nutrition choices, exercise and eliminate habits that are contributing to their cardiac risk. We agreed to a plan to decrease his cardiovascular risks. Discussed ASA. Reviewed Guidelines and approved recommendations made to patient.   The patient's 10 yr CV risk was estimated at  7.6 % 9/2024      Elevated Lipids:  and HDL 71 on labs in 9/24. She is going to restart Zetia   Explained goal for LDL to be less than 100 and ideally less than 70   She failed Lipitor due to muscle pain.   Continue ezetimibe 10 mg daily   Ca cardiac score 9/24 score is 1.   Discussed diet and exercise for lipid control      Hypothyroid: TSH 0.89 on labs in 9/2024   Continue BRANDED Synthroid 100 mcg daily.      Hypokalemia: levels stable on labs in 9/24. Recommend she restart potassium   She stopped potassium chloride due to  causing GI upset   Restart potassium chloride 10 mEq QOD      Migrainous vertigo:   She is no longer taking sumatriptan    She has Zofran prn nausea and Meclizine prn vertigo.   Continue Nasonex daily and Mg 250 mg 2 tablets at bedtime   She could not tolerate riboflavin.   MRI brain 9/18 nodular lesion along the superior margin of the pituitary gland extending cranially into the suprasellar region along the right ventral margin measuring approx 9 mm x 5 mm in transaxial dimensions, compared this to MRI done 5/21/2009 and appeared to be unchanged.   Continue acupuncture and massage therapy for migraines   She has a Medrol Dose Jovon on hand to use if she can not break the migraine   She saw Dr Matthews in 8/2024     Forgetfulness and feeling distracted :   I feel this is medication related vs age related   Her stress levels contribute to this  MRI of the brain on 6/2021 stable compared to previous studies   Nodule is stable near pituitary area and not affecting anything      GERD:   Continue Nexium 40 mg daily   EGD was normal in 8/16 and so nothing needed to be changed   She saw Dr Dixon in 5/2021       Anxiety and depression:   Continue fluoxetine 40 mg daily. Warned patient of food satiation with SSRI's   She has increased stressors taking care of her mother   Her mom is 90 + y/o. Her dad passed away in 2/17 from lymphoma   She retired from her job 8/1/18.     IBS- C:   I would like her to stop Benefiber and use her food intake to get fiber   She did not start the FODMAP diet, she found it to be too restrictive.  Colonoscopy 6/17/2021 normal and Q 7   She saw Dr Dixon in 5/2021      Right shoulder and neck pain:  She completed PT with good results   PT helped with all the HA and vertigo issues as well   She saw ortho, they feel she is where she needs to be, no recommendations for sx.     Left knee pain:   There was nothing found on exam with Dr Cuevas at 5/30/19 appt   Xrays of left knee were normal      Right  carpal tunnel:  Continue wearing the brace at night   Xrays 3/2021 degenerative changes, most severely at the carpometacarpal joint   She would like to hold off of surgery for now    She saw Dr Akbar in 3/2021      Sleep apnea Dx in 9/17 :  (diagnosed by [facility] at  7/2017 but report is not available for review)    She saw the sleep specialist in 11/2023     Eczema and itchy rash during winter months  Continue mometasone cream prn and this seems to help   She sees derm yearly      Vitamin D deficiency- Levels 80 on labs in 9/24  She feels more comfortable when her levels are above 50  Discontinue scripted Vitamin D due to concerns of bone loss with higher doses  Recommend taking OTC Vitamin D3 5000 UT daily      PAP normal 2018 and this was her last one   She had an ovarian cyst and saw Dr Carr   Pelvic U/S 9/2013 right cyst is resolved and no other abnormalities   Mammo + regino 11/2022 was normal and ordered 8/2023  Breast exam normal except scars present from breast reduction surgery in the past 8/2023     BD in 4/19 T -0.9. Continue 1 ES Tums daily and OTC Vitamin D3 5000 UT daily and eat 2 servings of calcium enriched foods daily.   Discussed importance of weight bearing exercises     Colonoscopy 6/2021 and Q 7 with Dr Dixon.      Ophth:   She wears glasses. No glaucoma or MD. She has the beginnings of cataracts that is being observed for now.      She has a living will and MPOA. Recommend she bring a copy of her Advanced Directives in office to have scanned in her chart 8/2023     Hep C negative 11/2021   FLU  9/17, 10/18, 9/19, 9/20, 10/21, 11/22, 9/24   TDAP 2008 , 5/19  Prevnar 20 recommended but wants to wait 8/2023  Pneumovax never   Zostavax 5/15    Shingrix 6/23/2020 and 9/22/2020  Pfizer CVD vaccine 3/6/2021 and 3/27/2021 and declines more       Some elements in the chart were copied from Dr. Sánchez's last office visit with patient. Notes have been updated where appropriate, and reflect my current  medical decision making from today.      RTC in 6 months for follow up or sooner if needed   (MCR due 9/2025, last mcr 9/10/24 Dr Colón)      Scribe Attestation  By signing my name below, I, Teri Galvan , Scribe   attest that this documentation has been prepared under the direction and in the presence of Jennifer Sánchez MD.

## 2024-09-26 ENCOUNTER — OFFICE VISIT (OUTPATIENT)
Dept: PRIMARY CARE | Facility: CLINIC | Age: 64
End: 2024-09-26
Payer: MEDICARE

## 2024-09-26 VITALS
HEART RATE: 70 BPM | SYSTOLIC BLOOD PRESSURE: 140 MMHG | BODY MASS INDEX: 26.52 KG/M2 | DIASTOLIC BLOOD PRESSURE: 80 MMHG | TEMPERATURE: 98 F | HEIGHT: 68 IN | WEIGHT: 175 LBS | OXYGEN SATURATION: 97 %

## 2024-09-26 DIAGNOSIS — E87.6 HYPOKALEMIA: ICD-10-CM

## 2024-09-26 DIAGNOSIS — E78.5 DYSLIPIDEMIA, GOAL LDL BELOW 100: ICD-10-CM

## 2024-09-26 DIAGNOSIS — K52.9 ENTERITIS: Primary | ICD-10-CM

## 2024-09-26 DIAGNOSIS — K21.00 GASTROESOPHAGEAL REFLUX DISEASE WITH ESOPHAGITIS WITHOUT HEMORRHAGE: ICD-10-CM

## 2024-09-26 DIAGNOSIS — Z78.9 STATIN INTOLERANCE: ICD-10-CM

## 2024-09-26 DIAGNOSIS — R03.0 ELEVATED BLOOD PRESSURE READING: ICD-10-CM

## 2024-09-26 DIAGNOSIS — E03.9 HYPOTHYROIDISM, UNSPECIFIED TYPE: ICD-10-CM

## 2024-09-26 DIAGNOSIS — R11.2 NAUSEA AND VOMITING, UNSPECIFIED VOMITING TYPE: ICD-10-CM

## 2024-09-26 DIAGNOSIS — R10.84 GENERALIZED ABDOMINAL PAIN: ICD-10-CM

## 2024-09-26 PROCEDURE — 1160F RVW MEDS BY RX/DR IN RCRD: CPT | Performed by: INTERNAL MEDICINE

## 2024-09-26 PROCEDURE — 1159F MED LIST DOCD IN RCRD: CPT | Performed by: INTERNAL MEDICINE

## 2024-09-26 PROCEDURE — 99214 OFFICE O/P EST MOD 30 MIN: CPT | Performed by: INTERNAL MEDICINE

## 2024-09-26 PROCEDURE — 3008F BODY MASS INDEX DOCD: CPT | Performed by: INTERNAL MEDICINE

## 2024-09-26 PROCEDURE — 1123F ACP DISCUSS/DSCN MKR DOCD: CPT | Performed by: INTERNAL MEDICINE

## 2024-09-26 PROCEDURE — 1036F TOBACCO NON-USER: CPT | Performed by: INTERNAL MEDICINE

## 2024-09-26 RX ORDER — POTASSIUM CHLORIDE 750 MG/1
10 TABLET, FILM COATED, EXTENDED RELEASE ORAL DAILY PRN
Qty: 90 TABLET | Refills: 3 | Status: SHIPPED | OUTPATIENT
Start: 2024-09-26 | End: 2025-09-26

## 2024-09-26 RX ORDER — HYDROGEN PEROXIDE 3 %
40 SOLUTION, NON-ORAL MISCELLANEOUS
Qty: 180 CAPSULE | Refills: 3 | Status: SHIPPED | OUTPATIENT
Start: 2024-09-26 | End: 2025-09-26

## 2024-09-26 RX ORDER — ONDANSETRON 4 MG/1
4 TABLET, ORALLY DISINTEGRATING ORAL EVERY 8 HOURS PRN
Qty: 20 TABLET | Refills: 0 | Status: SHIPPED | OUTPATIENT
Start: 2024-09-26 | End: 2024-09-27 | Stop reason: SDUPTHER

## 2024-09-27 ENCOUNTER — APPOINTMENT (OUTPATIENT)
Dept: PRIMARY CARE | Facility: CLINIC | Age: 64
End: 2024-09-27
Payer: MEDICARE

## 2024-09-27 DIAGNOSIS — R11.2 NAUSEA AND VOMITING, UNSPECIFIED VOMITING TYPE: ICD-10-CM

## 2024-09-27 DIAGNOSIS — R10.84 GENERALIZED ABDOMINAL PAIN: ICD-10-CM

## 2024-09-27 RX ORDER — ONDANSETRON 4 MG/1
4 TABLET, ORALLY DISINTEGRATING ORAL EVERY 8 HOURS PRN
Qty: 20 TABLET | Refills: 0 | Status: SHIPPED | OUTPATIENT
Start: 2024-09-27 | End: 2024-10-04

## 2024-10-01 ENCOUNTER — APPOINTMENT (OUTPATIENT)
Dept: RADIOLOGY | Facility: CLINIC | Age: 64
End: 2024-10-01
Payer: MEDICARE

## 2024-10-02 NOTE — PROGRESS NOTES
Alize Castano is a 64 y.o. female with past medical history of sleep apnea, hypothyroidism, and GERD who is referred by PCP Dr. Jennifer Sánchez today for enteritis. Reports issues chronic with reflux but overall felt well. In August had 24 hours of diarrhea which is abnormal for her. Then had several weeks where she felt well and then diarrhea occurred again. Presented to ER 9/22/2024 due to sudden onset of symptoms with diffuse abdominal pain, nausea, and over 10 episodes of non-bloody diarrhea. Eventually started vomiting and felt dehydrated so presented to ER. No anemia. CT scan showed enteritis. Given Zofran and discharged.    Presents today, still not feeling back to baseline. Appetite continues to be a bit poor. Was eating very bland diet but yesterday was able to tolerate rice. She has not lost weight. She is no longer having diarrhea but bowels are not back to baseline (used to go 2-3 days without BM, used to take Miralax as needed). Currently having BM every other day but stools are very small amount, and sometimes will go to pass gas and stool will leak out. Continues to have some abdominal discomfort, bloating. Nausea/vomiting resolved.    Colonoscopy 2021 (Dr. Coleman Dixon) diverticulosis otherwise normal.     Social history: No tobacco. Occasional alcohol, 3-4 drinks per week. Denies illicit drug use.    Family history: Denies family history of IBD, colon cancer, or other GI disorders or malignancy.     Past Medical History:   Diagnosis Date    Acute pharyngitis, unspecified 04/23/2015    Acute viral pharyngitis    Acute tonsillitis, unspecified 10/01/2019    Tonsillitis with exudate    Acute upper respiratory infection, unspecified 11/27/2019    Acute upper respiratory infection    Contact with and (suspected) exposure to other viral communicable diseases 12/23/2019    Exposure to influenza    Cough, unspecified 04/23/2015    Cough    Cramp and spasm 03/11/2016    Calf cramp    Disorientation,  unspecified 05/17/2021    Confusion and disorientation    Diverticulosis of intestine, part unspecified, without perforation or abscess without bleeding     Diverticulosis    Other abnormal and inconclusive findings on diagnostic imaging of breast 11/18/2015    Abnormal mammogram    Other lack of coordination 08/30/2018    Decreased coordination    Pain in arm, unspecified 01/17/2014    Arm pain    Personal history of diseases of the skin and subcutaneous tissue 12/02/2014    History of eczema    Personal history of other diseases of the female genital tract 06/20/2013    History of dysfunctional uterine bleeding    Personal history of other diseases of the female genital tract 06/20/2013    History of ovarian cyst    Personal history of other diseases of the respiratory system     History of sore throat    Personal history of other diseases of the respiratory system 12/14/2018    History of acute bronchitis    Personal history of other specified conditions 08/30/2018    History of vertigo    Personal history of other specified conditions 09/08/2014    History of diarrhea    Personal history of urinary (tract) infections 11/27/2013    History of acute cystitis    Sleep apnea, unspecified 04/12/2018    Sleep apnea in adult    Strain of muscle, fascia and tendon of lower back, initial encounter 03/09/2016    Strain of lumbar region, initial encounter    Strain of muscle, fascia and tendon of lower back, initial encounter 06/15/2016    Strain of lumbar region, initial encounter    Vitamin deficiency, unspecified 05/13/2015    Vitamin deficiency     Past Surgical History:   Procedure Laterality Date    BREAST SURGERY  09/08/2014    Breast Surgery Reduction Procedure    CHOLECYSTECTOMY  06/13/2016    Cholecystectomy    COLONOSCOPY  07/13/2015    Complete Colonoscopy    ESOPHAGOGASTRODUODENOSCOPY  06/13/2016    Diagnostic Esophagogastroduodenoscopy    OTHER SURGICAL HISTORY  11/25/2013    Wedge Resection Of Lung  "    Current Outpatient Medications   Medication Sig Dispense Refill    esomeprazole (NexIUM) 20 mg DR capsule Take 2 capsules (40 mg) by mouth once daily in the morning. Take before meals. Do not open capsule. 180 capsule 3    ezetimibe (Zetia) 10 mg tablet Take 1 tablet (10 mg) by mouth once daily in the evening. 90 tablet 1    FLUoxetine (PROzac) 20 mg tablet TAKE 2 TABLETS (40MG) ONCE DAILY 180 tablet 2    hydrocortisone 2.5 % cream Apply topically.      meclizine (Antivert) 25 mg tablet Take 1 tablet (25 mg) by mouth 3 times a day as needed.      ondansetron ODT (Zofran-ODT) 4 mg disintegrating tablet Take 1 tablet (4 mg) by mouth every 8 hours if needed for nausea or vomiting for up to 7 days. 20 tablet 0    potassium chloride CR 10 mEq ER tablet Take 1 tablet (10 mEq) by mouth once daily as needed (hypokalemia and spasms). 90 tablet 3    sod chloride-sodium bicarb (Nasal Wash) nasal rinse Administer into affected nostril(s).      Synthroid 100 mcg tablet TAKE 1 TABLET ONCE DAILY 90 tablet 3     No current facility-administered medications for this visit.       Review of Systems  Review of Systems negative except as noted in HPI.    Objective     /75   Pulse 67   Temp 37 °C (98.6 °F)   Ht 1.727 m (5' 8\")   Wt 80.3 kg (177 lb)   BMI 26.91 kg/m²      Physical Exam  Constitutional:  No acute distress. Normal appearance. Not ill-appearing.  HENT:  Head normocephalic and atraumatic. Conjunctivae normal.  Cardiovascular:  Normal rate. Regular rhythm.  Pulmonary:  Pulmonary effort normal. No respiratory distress. Breath sounds clear.  Abdominal:  Abdomen is flat and soft. There is no distension. Mild diffuse abdominal discomfort with palpation. No rebound or guarding.  Skin: Dry.  Neurological:  Alert and oriented.  Psychiatric:  Mood and affect normal.    Assessment/Plan     64 y.o. female with history of sleep apnea, hypothyroidism, and GERD who presents today for initial clinic visit for change in bowels " with non-bloody diarrhea, abdominal discomfort, bloating, and nausea. She had a mild episode lasting 24 hours in August with recurrence that was much more severe in September requiring ER visit. CT at that time showed enteritis. She has noted some improvement over time, but is still not feeling back to baseline with discomfort, bloating, and irregular bowels. We discussed obtaining stool studies to distinguish between a post-infectious IBS versus IBD. If no improvement in symptoms will need colonoscopy to further evaluate.    Recommendations  Continue Benefiber. We would start with 1 teaspoon daily in 8 ounces of water and then after a week increase to 2 teaspoons daily and then in the third week increase to 3 teaspoons daily (1 tablespoon) as tolerated. You need to make sure you drink plenty of water when you take the fiber so we can keep the stool softer, bulkier, and easier to pass. Hopefully this would improve the consistency of stool and ease of defecation.    Obtain stool pathogens testing and fecal calprotectin.   Schedule colonoscopy. Discussed procedure and Miralax prep. If symptoms resolve OK to cancel procedure.  Follow up in January.    Electronically signed by: Heidi Rausch CNP on 10/3/2024 at 1:27 PM

## 2024-10-03 ENCOUNTER — OFFICE VISIT (OUTPATIENT)
Dept: GASTROENTEROLOGY | Facility: CLINIC | Age: 64
End: 2024-10-03
Payer: MEDICARE

## 2024-10-03 VITALS
DIASTOLIC BLOOD PRESSURE: 75 MMHG | WEIGHT: 177 LBS | HEIGHT: 68 IN | TEMPERATURE: 98.6 F | HEART RATE: 67 BPM | BODY MASS INDEX: 26.83 KG/M2 | SYSTOLIC BLOOD PRESSURE: 137 MMHG

## 2024-10-03 DIAGNOSIS — R93.5 ABNORMAL CT OF THE ABDOMEN: ICD-10-CM

## 2024-10-03 DIAGNOSIS — R19.7 DIARRHEA, UNSPECIFIED TYPE: ICD-10-CM

## 2024-10-03 DIAGNOSIS — K21.9 GASTROESOPHAGEAL REFLUX DISEASE, UNSPECIFIED WHETHER ESOPHAGITIS PRESENT: ICD-10-CM

## 2024-10-03 DIAGNOSIS — R19.4 CHANGE IN BOWEL HABITS: ICD-10-CM

## 2024-10-03 DIAGNOSIS — R10.9 ABDOMINAL DISCOMFORT: ICD-10-CM

## 2024-10-03 DIAGNOSIS — K52.9 ENTERITIS: Primary | ICD-10-CM

## 2024-10-03 PROCEDURE — 3008F BODY MASS INDEX DOCD: CPT | Performed by: NURSE PRACTITIONER

## 2024-10-03 PROCEDURE — 1123F ACP DISCUSS/DSCN MKR DOCD: CPT | Performed by: NURSE PRACTITIONER

## 2024-10-03 PROCEDURE — 99214 OFFICE O/P EST MOD 30 MIN: CPT | Performed by: NURSE PRACTITIONER

## 2024-10-03 PROCEDURE — 1159F MED LIST DOCD IN RCRD: CPT | Performed by: NURSE PRACTITIONER

## 2024-10-03 PROCEDURE — 99204 OFFICE O/P NEW MOD 45 MIN: CPT | Performed by: NURSE PRACTITIONER

## 2024-10-03 RX ORDER — OMEPRAZOLE 40 MG/1
40 CAPSULE, DELAYED RELEASE ORAL DAILY
Qty: 90 CAPSULE | Refills: 3 | Status: SHIPPED | OUTPATIENT
Start: 2024-10-03 | End: 2025-10-03

## 2024-10-03 NOTE — PATIENT INSTRUCTIONS
Recommendations  Continue Benefiber. We would start with 1 teaspoon daily in 8 ounces of water and then after a week increase to 2 teaspoons daily and then in the third week increase to 3 teaspoons daily (1 tablespoon) as tolerated. You need to make sure you drink plenty of water when you take the fiber so we can keep the stool softer, bulkier, and easier to pass. Hopefully this would improve the consistency of stool and ease of defecation.    Obtain stool tests and fecal calprotectin.   Schedule colonoscopy. You can call [phone] to schedule. Make sure to ask for Miralax prep instructions. If symptoms resolve OK to cancel procedure.  Follow up in January if you continue to have symptoms. Please call the office at [phone] with any questions or concerns.

## 2024-10-05 ENCOUNTER — LAB (OUTPATIENT)
Dept: LAB | Facility: LAB | Age: 64
End: 2024-10-05
Payer: MEDICARE

## 2024-10-05 DIAGNOSIS — R19.7 DIARRHEA, UNSPECIFIED TYPE: ICD-10-CM

## 2024-10-05 DIAGNOSIS — R10.9 ABDOMINAL DISCOMFORT: ICD-10-CM

## 2024-10-05 DIAGNOSIS — R19.4 CHANGE IN BOWEL HABITS: ICD-10-CM

## 2024-10-05 LAB
ATRIAL RATE: 69 BPM
P AXIS: 55 DEGREES
P OFFSET: 194 MS
P ONSET: 134 MS
PR INTERVAL: 178 MS
Q ONSET: 223 MS
QRS COUNT: 12 BEATS
QRS DURATION: 94 MS
QT INTERVAL: 456 MS
QTC CALCULATION(BAZETT): 488 MS
QTC FREDERICIA: 478 MS
R AXIS: 23 DEGREES
T AXIS: 49 DEGREES
T OFFSET: 451 MS
VENTRICULAR RATE: 69 BPM

## 2024-10-05 PROCEDURE — 83993 ASSAY FOR CALPROTECTIN FECAL: CPT

## 2024-10-05 PROCEDURE — 87506 IADNA-DNA/RNA PROBE TQ 6-11: CPT

## 2024-10-07 LAB

## 2024-10-09 ENCOUNTER — HOSPITAL ENCOUNTER (OUTPATIENT)
Dept: RADIOLOGY | Facility: CLINIC | Age: 64
Discharge: HOME | End: 2024-10-09
Payer: MEDICARE

## 2024-10-09 VITALS — HEIGHT: 68 IN | WEIGHT: 177 LBS | BODY MASS INDEX: 26.83 KG/M2

## 2024-10-09 DIAGNOSIS — Z12.31 VISIT FOR SCREENING MAMMOGRAM: ICD-10-CM

## 2024-10-09 LAB — CALPROTECTIN STL-MCNT: 102 UG/G

## 2024-10-09 PROCEDURE — 77067 SCR MAMMO BI INCL CAD: CPT

## 2024-10-10 ENCOUNTER — APPOINTMENT (OUTPATIENT)
Dept: PRIMARY CARE | Facility: CLINIC | Age: 64
End: 2024-10-10
Payer: MEDICARE

## 2024-10-10 DIAGNOSIS — Z12.11 COLON CANCER SCREENING: Primary | ICD-10-CM

## 2024-10-14 RX ORDER — SODIUM, POTASSIUM,MAG SULFATES 17.5-3.13G
SOLUTION, RECONSTITUTED, ORAL ORAL
Qty: 1 EACH | Refills: 0 | Status: SHIPPED | OUTPATIENT
Start: 2024-10-14

## 2024-10-15 ENCOUNTER — HOSPITAL ENCOUNTER (OUTPATIENT)
Dept: RADIOLOGY | Facility: CLINIC | Age: 64
Discharge: HOME | End: 2024-10-15
Payer: MEDICARE

## 2024-10-15 PROCEDURE — 77080 DXA BONE DENSITY AXIAL: CPT

## 2024-10-15 PROCEDURE — 77080 DXA BONE DENSITY AXIAL: CPT | Performed by: RADIOLOGY

## 2024-10-16 ENCOUNTER — APPOINTMENT (OUTPATIENT)
Dept: RADIOLOGY | Facility: CLINIC | Age: 64
End: 2024-10-16
Payer: MEDICARE

## 2024-10-16 NOTE — H&P (VIEW-ONLY)
Subjective   Patient ID: Alize Castano is a 64 y.o. female who presents for follow up multiple medical conditions      She is scheduled for the colonoscopy on 11/4/24   She has an appt with Dr Watson on 12/5/2024    She is experiencing constipation.     She feels she needs to discontinue Prozac  She has become increasing inpatient and having vivid dreams.  She wakes from her dreams angry and frustrated.   She knows these are dreams but she feels the emotion when she wakes up   She is going to [place] with a group and can not afford to be inpatient.  She has little patience for her mother lately also   Her dog is afraid of her sometimes     She is not eating enough during the day so by the afternoon she is aggressively hungry     She is wearing her CPAP nightly   She has an appt with the sleep specialist in 12/24       HEALTH:  PAP 2013, 2/18 - and no longer need to repeat   Mammo 10/18, 9/20, 11/21, 11/22, 10/24   BD 9/12, 4/19 T-0.9, 10/24 T-1.3  Colon 2008, 7/15 with Dr Dixon, 6/21 nl and Q 7, will repeat in 2028  Ca cardiac score 6/12 was ZERO, 9/24 score is 1.   EKG 12/15, 1/18, 6/20, 6/21, 2/23, 8/23, 9/24   U/A 12/14, 12/15, 1/17, 2/18, 3/19, 6/20, 8/21   Hep C negative 11/2021   FLU  9/17, 10/18, 9/19, 9/20, 10/21, 11/22, 9/24   TDAP 2008 , 5/19  Prevnar 20 recommended but wants to wait 8/23  Pneumovax never   Zostavax 5/15    Shingrix 6/23/2020 and 9/22/2020  Pfizer CVD vaccine 3/6/2021 and 3/27/2021 and declines more   Ophth She wears glasses. No glaucoma or MD. She has the beginnings of cataracts that is being observed for now.        Review of Systems  All systems negative except those listed in the HPI       Objective   Visit Vitals  /80 (BP Location: Left arm, Patient Position: Sitting, BP Cuff Size: Adult)   Pulse 69   Temp 36.4 °C (97.6 °F)   Resp 16    Body mass index is 27.22 kg/m².      Physical Exam  Vitals reviewed.   Constitutional:       Appearance: Normal appearance.   HENT:       Head: Normocephalic.      Right Ear: Tympanic membrane, ear canal and external ear normal.      Left Ear: Tympanic membrane, ear canal and external ear normal.      Nose: Nose normal.      Mouth/Throat:      Pharynx: Oropharynx is clear.   Eyes:      Conjunctiva/sclera: Conjunctivae normal.   Cardiovascular:      Rate and Rhythm: Normal rate and regular rhythm.      Pulses: Normal pulses.      Heart sounds: Normal heart sounds.   Pulmonary:      Effort: Pulmonary effort is normal.      Breath sounds: Normal breath sounds.   Abdominal:      Palpations: Abdomen is soft.      Comments: very slow transit time, + retained stool noted, distention to abdomen    Musculoskeletal:         General: Normal range of motion.      Cervical back: Normal range of motion and neck supple.   Skin:     General: Skin is warm.   Neurological:      General: No focal deficit present.      Mental Status: She is alert and oriented to person, place, and time.   Psychiatric:         Mood and Affect: Mood normal.         Behavior: Behavior normal.         Thought Content: Thought content normal.         Judgment: Judgment normal.       Assessment/Plan   Problem List Items Addressed This Visit       Enteritis - Primary    Generalized anxiety disorder    Relevant Medications    FLUoxetine (PROzac) 20 mg tablet    desvenlafaxine (Pristiq) 50 mg 24 hr tablet    ARIPiprazole (Abilify) 2 mg tablet    Hypothyroidism    Migraine headache       Follow up completed  Reviewed her labs from 9/24 and 10/24     She has no children and is in a committed relationship.   She denies previous history of tobacco use  She is a retired . She still plays softball in the summer   Her mom is 90 + y/o. Her dad passed away in 2/17 from lymphoma      Anxiety and depression: She feels she needs to discontinue Prozac. She has become increasing inpatient and having vivid dreams. She wakes from her dreams angry and frustrated. She knows these are dreams but she feels the  emotion when she wakes up. She is going to [place] with a group and can not afford to be inpatient. She has little patience for her mother lately also. Discussed adding a mood stabilizer such as Abilify. We will add Abilify  She has increased stressors taking care of her mother   Her mom is 90 + y/o. Her dad passed away in 2/17 from lymphoma   She retired from her job 8/1/18.   We will discontinue fluoxetine due to feeling inpatient and having vivid dreams   Decrease Prozac to 20 mg daily for a week, then 1/2 tablet a day for 2 weeks then stop. Once she begins 1/2 tablet of Prozac she will restart Pristiq   Prescription sent in for Pristiq 50 mg daily, possible side effects discussed with medication. Recommend she look into [facility] with medications    Prescription sent in for Abilify 2 mg daily, possible side effects discussed with medication. Recommend she look into [facility] with medications   Explained during the transition of Prozac to Pristiq there may be possible side effects. Side effects discussed with patient 10/24       Seen in the ED 9/22/2024 for enteritis: On exam: very slow transit time, + retained stool noted, distention to abdomen 10/24. Reviewed her labs from 10/24. Calprotectin mildly elevated at 102 10/24. She is experiencing constipation. Recommend she look into glycerin suppositories (avoid stimulants). Explained the importance of being able to empty her colon prior to having the colonoscopy 11/24   EKG 9/24 NSR   CT Abd 9/24 negative for obstruction and revealed likely finding related to enteritis.   Hypokalemia is replenished. Patient is discharged home to increase oral fluid intake, discharged on Zofran with instruction to take Motrin/Tylenol for pain control   She saw SHERI Rausch in 10/24 and continue Benefiber, Obtain stool pathogens testing and fecal calprotectin and schedule colonoscopy   Increase Miralax to 1 scoop BID   She is scheduled for the colonoscopy on 11/4/24   She has an appt  with Dr Watson on 12/5/2024   Recommend she avoid night shade fruits and vegetables- foods discussed 10/24    BD 10/24 T-1.3. Long discussion explaining the BD from 10/24.   Continue 1 ES Tums daily and OTC Vitamin D3 5000 UT daily and eat 2 servings of calcium enriched foods daily.   Discussed importance of weight bearing exercises       She saw Dr Mcdonald on 8/23/18 for hearing evaluation   Ruled out Menieres disease. She has intermittent tinnitus   Continue vitamin B2 2-3 times a week and Rx given for Antivert.   She saw Dr Ozuna on 9/24/19. She has sensorineural hearing loss of the right ear with unrestricted hearing of left ear.   Recommend she follow with Evonne Ozuna CNP and annual audiological evaluation     Her weight in office is 179 with BMI of 27.22. We spent 15 minutes discussing diet and weight loss. The struggle of weight loss persists   Explained goal for BMI to be 25 or less      HTN-    She stopped Triamterene/HCTZ 37.5/25 mg.   She can stay off the diuretic for now.   EKG 9/24 NSR   ECHO 2/2023 LV systolic function normal with EF 65 -70%   Stress test 4/2023 no evidence for inducible ischemia or previous myocardial scarring, with normal left and right ventricular size and systolic function and preserved EF  Recommend she monitor her BP at home and call with elevated readings   She saw Dr Delarosa in 4/2023 and only needs to follow prn         I have spent 15 min face to face with this patient discussing their cardiac risk   We discussed the patients cardiovascular risk. If needed, lifestyle modifications recommended including: behavioral therapies of nutrition choices, exercise and eliminate habits that are contributing to their cardiac risk. We agreed to a plan to decrease his cardiovascular risks. Discussed ASA. Reviewed Guidelines and approved recommendations made to patient.   The patient's 10 yr CV risk was estimated at  7.3 % 10/2024      Elevated Lipids:  and HDL 71 on labs in 9/24.  She restarted Zetia. Explained Ct cardiac score results from 9/24 in detail to patient 10/24   Explained goal for LDL to be less than 100 and ideally less than 70   Continue ezetimibe 10 mg daily   She failed Lipitor due to muscle pain.    Ca cardiac score 9/24 score was 1.   Discussed diet and exercise for lipid control      Hypothyroid: TSH 0.89 on labs in 9/2024   Continue BRANDED Synthroid 100 mcg daily.      Hypokalemia: levels stable on labs in 9/24.    Continue potassium chloride 10 mEq QOD      Migrainous vertigo:   She is no longer taking sumatriptan    She has Zofran prn nausea and Meclizine prn vertigo.   Continue Nasonex daily and Mg 250 mg 2 tablets at bedtime   She could not tolerate riboflavin.   MRI brain 9/18 nodular lesion along the superior margin of the pituitary gland extending cranially into the suprasellar region along the right ventral margin measuring approx 9 mm x 5 mm in transaxial dimensions, compared this to MRI done 5/21/2009 and appeared to be unchanged.   Continue acupuncture and massage therapy for migraines   She has a Medrol Dose Jovon on hand to use if she can not break the migraine   She saw Dr Matthews in 8/2024     Forgetfulness and feeling distracted :   I feel this is medication related vs age related   Her stress levels contribute to this  MRI of the brain on 6/2021 stable compared to previous studies   Nodule is stable near pituitary area and not affecting anything      GERD:   Continue omeprazole 40 mg daily   EGD was normal in 8/16 and so nothing needed to be changed   She saw Dr Dixon in 5/2021       IBS- C:   I would like her to stop Benefiber and use her food intake to get fiber   She did not start the FODMAP diet, she found it to be too restrictive.  Colonoscopy 6/17/2021 normal and Q 7   She saw Dr Dixon in 5/2021      Right shoulder and neck pain:  She completed PT with good results   PT helped with all the HA and vertigo issues as well   She saw ortho, they feel she  is where she needs to be, no recommendations for sx.     Left knee pain:   Xrays of left knee were normal    There was nothing found on exam with Dr Cuevas at 5/30/19 appt      Right carpal tunnel:  Xrays 3/2021 degenerative changes, most severely at the carpometacarpal joint   She would like to hold off of surgery for now    Continue wearing the brace at night    She saw Dr Akbar in 3/2021      HERIBERTO Dx in 9/17 :  (diagnosed by [facility] at  7/2017 but report is not available for review)    She is wearing her CPAP nightly   She has an appt with the sleep specialist in 12/24       Eczema and itchy rash during winter months  Continue mometasone cream prn and this seems to help   She sees derm yearly      Vitamin D deficiency- Levels 80 on labs in 9/24  She feels more comfortable when her levels are above 50  Discontinue scripted Vitamin D due to concerns of bone loss with higher doses  Recommend taking OTC Vitamin D3 5000 UT daily      PAP normal 2018 and this was her last one   She had an ovarian cyst and saw Dr Carr   Pelvic U/S 9/2013 right cyst is resolved and no other abnormalities     Mammo normal 10/24   Breast exam normal except scars present from breast reduction surgery in the past 8/2023     Colonoscopy 6/2021 and Q 7 with Dr Dixon.      Ophth:   She wears glasses. No glaucoma or MD. She has the beginnings of cataracts that is being observed for now.      She has a living will and MPOA. Recommend she bring a copy of her Advanced Directives in office to have scanned in her chart 8/2023     Hep C negative 11/2021   FLU  9/17, 10/18, 9/19, 9/20, 10/21, 11/22, 9/24   TDAP 2008 , 5/19  Prevnar 20 recommended but wants to wait 8/23  Pneumovax never   Zostavax 5/15    Shingrix 6/23/2020 and 9/22/2020  Pfizer CVD vaccine 3/6/2021 and 3/27/2021 and declines more      Some elements in the chart were copied from Dr. Sánchez's last office visit with patient. Notes have been updated where appropriate, and reflect my  current medical decision making from today.      RTC in early 12/24 for follow up or sooner if needed   Keep scheduled appt in 2/25 for follow up or sooner if needed   (MCR due 9/2025, last mcr 9/10/24 Dr Colón)      Scribe Attestation  By signing my name below, I, Teri Galvan , Scribe   attest that this documentation has been prepared under the direction and in the presence of Jennifer Sánchez MD.

## 2024-10-17 ENCOUNTER — OFFICE VISIT (OUTPATIENT)
Dept: PRIMARY CARE | Facility: CLINIC | Age: 64
End: 2024-10-17
Payer: COMMERCIAL

## 2024-10-17 VITALS
DIASTOLIC BLOOD PRESSURE: 80 MMHG | HEIGHT: 68 IN | WEIGHT: 179 LBS | BODY MASS INDEX: 27.13 KG/M2 | RESPIRATION RATE: 16 BRPM | HEART RATE: 69 BPM | TEMPERATURE: 97.6 F | SYSTOLIC BLOOD PRESSURE: 134 MMHG | OXYGEN SATURATION: 98 %

## 2024-10-17 DIAGNOSIS — R53.83 OTHER FATIGUE: ICD-10-CM

## 2024-10-17 DIAGNOSIS — F41.1 GENERALIZED ANXIETY DISORDER: ICD-10-CM

## 2024-10-17 DIAGNOSIS — G43.119 INTRACTABLE MIGRAINE WITH AURA WITHOUT STATUS MIGRAINOSUS: ICD-10-CM

## 2024-10-17 DIAGNOSIS — G47.33 OSA (OBSTRUCTIVE SLEEP APNEA): ICD-10-CM

## 2024-10-17 DIAGNOSIS — K52.9 ENTERITIS: Primary | ICD-10-CM

## 2024-10-17 DIAGNOSIS — E03.9 HYPOTHYROIDISM, UNSPECIFIED TYPE: ICD-10-CM

## 2024-10-17 DIAGNOSIS — E88.810 INSULIN RESISTANCE SYNDROME: ICD-10-CM

## 2024-10-17 DIAGNOSIS — K21.00 GASTROESOPHAGEAL REFLUX DISEASE WITH ESOPHAGITIS WITHOUT HEMORRHAGE: ICD-10-CM

## 2024-10-17 DIAGNOSIS — E78.5 DYSLIPIDEMIA, GOAL LDL BELOW 100: ICD-10-CM

## 2024-10-17 PROCEDURE — G2211 COMPLEX E/M VISIT ADD ON: HCPCS | Performed by: INTERNAL MEDICINE

## 2024-10-17 PROCEDURE — 1160F RVW MEDS BY RX/DR IN RCRD: CPT | Performed by: INTERNAL MEDICINE

## 2024-10-17 PROCEDURE — 1036F TOBACCO NON-USER: CPT | Performed by: INTERNAL MEDICINE

## 2024-10-17 PROCEDURE — 1123F ACP DISCUSS/DSCN MKR DOCD: CPT | Performed by: INTERNAL MEDICINE

## 2024-10-17 PROCEDURE — 99214 OFFICE O/P EST MOD 30 MIN: CPT | Performed by: INTERNAL MEDICINE

## 2024-10-17 PROCEDURE — 3008F BODY MASS INDEX DOCD: CPT | Performed by: INTERNAL MEDICINE

## 2024-10-17 PROCEDURE — 1159F MED LIST DOCD IN RCRD: CPT | Performed by: INTERNAL MEDICINE

## 2024-10-17 RX ORDER — DESVENLAFAXINE 50 MG/1
50 TABLET, EXTENDED RELEASE ORAL DAILY
Qty: 90 TABLET | Refills: 3 | Status: SHIPPED | OUTPATIENT
Start: 2024-10-17 | End: 2025-10-17

## 2024-10-17 RX ORDER — ARIPIPRAZOLE 2 MG/1
2 TABLET ORAL DAILY
Qty: 90 TABLET | Refills: 3 | Status: SHIPPED | OUTPATIENT
Start: 2024-10-17 | End: 2025-10-17

## 2024-10-17 RX ORDER — FLUOXETINE 20 MG/1
TABLET ORAL
Qty: 180 TABLET | Refills: 2 | Status: SHIPPED | OUTPATIENT
Start: 2024-10-17

## 2024-10-17 ASSESSMENT — PATIENT HEALTH QUESTIONNAIRE - PHQ9
2. FEELING DOWN, DEPRESSED OR HOPELESS: NOT AT ALL
SUM OF ALL RESPONSES TO PHQ9 QUESTIONS 1 AND 2: 0
1. LITTLE INTEREST OR PLEASURE IN DOING THINGS: NOT AT ALL

## 2024-10-23 ENCOUNTER — APPOINTMENT (OUTPATIENT)
Dept: PRIMARY CARE | Facility: CLINIC | Age: 64
End: 2024-10-23
Payer: MEDICARE

## 2024-10-24 ENCOUNTER — ANESTHESIA EVENT (OUTPATIENT)
Dept: GASTROENTEROLOGY | Facility: EXTERNAL LOCATION | Age: 64
End: 2024-10-24

## 2024-11-04 ENCOUNTER — APPOINTMENT (OUTPATIENT)
Dept: GASTROENTEROLOGY | Facility: EXTERNAL LOCATION | Age: 64
End: 2024-11-04
Payer: MEDICARE

## 2024-11-04 ENCOUNTER — ANESTHESIA (OUTPATIENT)
Dept: GASTROENTEROLOGY | Facility: EXTERNAL LOCATION | Age: 64
End: 2024-11-04

## 2024-11-04 VITALS
RESPIRATION RATE: 15 BRPM | TEMPERATURE: 98.1 F | HEART RATE: 62 BPM | SYSTOLIC BLOOD PRESSURE: 143 MMHG | OXYGEN SATURATION: 97 % | DIASTOLIC BLOOD PRESSURE: 86 MMHG

## 2024-11-04 DIAGNOSIS — R19.7 DIARRHEA, UNSPECIFIED TYPE: ICD-10-CM

## 2024-11-04 DIAGNOSIS — R19.4 CHANGE IN BOWEL HABITS: Primary | ICD-10-CM

## 2024-11-04 DIAGNOSIS — R93.5 ABNORMAL CT OF THE ABDOMEN: ICD-10-CM

## 2024-11-04 DIAGNOSIS — R10.9 ABDOMINAL DISCOMFORT: ICD-10-CM

## 2024-11-04 PROCEDURE — 45378 DIAGNOSTIC COLONOSCOPY: CPT | Performed by: INTERNAL MEDICINE

## 2024-11-04 RX ORDER — SODIUM CHLORIDE 9 MG/ML
INJECTION, SOLUTION INTRAVENOUS CONTINUOUS PRN
Status: DISCONTINUED | OUTPATIENT
Start: 2024-11-04 | End: 2024-11-04

## 2024-11-04 RX ORDER — LIDOCAINE HYDROCHLORIDE 20 MG/ML
INJECTION, SOLUTION INFILTRATION; PERINEURAL AS NEEDED
Status: DISCONTINUED | OUTPATIENT
Start: 2024-11-04 | End: 2024-11-04

## 2024-11-04 RX ORDER — PROPOFOL 10 MG/ML
INJECTION, EMULSION INTRAVENOUS AS NEEDED
Status: DISCONTINUED | OUTPATIENT
Start: 2024-11-04 | End: 2024-11-04

## 2024-11-04 SDOH — HEALTH STABILITY: MENTAL HEALTH: CURRENT SMOKER: 0

## 2024-11-04 ASSESSMENT — PAIN SCALES - GENERAL
PAINLEVEL_OUTOF10: 0 - NO PAIN
PAIN_LEVEL: 0
PAINLEVEL_OUTOF10: 0 - NO PAIN
PAINLEVEL_OUTOF10: 0 - NO PAIN

## 2024-11-04 ASSESSMENT — PAIN - FUNCTIONAL ASSESSMENT
PAIN_FUNCTIONAL_ASSESSMENT: 0-10

## 2024-11-04 ASSESSMENT — COLUMBIA-SUICIDE SEVERITY RATING SCALE - C-SSRS
1. IN THE PAST MONTH, HAVE YOU WISHED YOU WERE DEAD OR WISHED YOU COULD GO TO SLEEP AND NOT WAKE UP?: NO
2. HAVE YOU ACTUALLY HAD ANY THOUGHTS OF KILLING YOURSELF?: NO
6. HAVE YOU EVER DONE ANYTHING, STARTED TO DO ANYTHING, OR PREPARED TO DO ANYTHING TO END YOUR LIFE?: NO

## 2024-11-04 NOTE — DISCHARGE INSTRUCTIONS

## 2024-11-04 NOTE — ANESTHESIA PREPROCEDURE EVALUATION
Patient: Alize Castano    Procedure Information       Date/Time: 11/04/24 1330    Scheduled providers: Nikhil Watson MD; SAIGE Valentino-CRNA    Procedure: COLONOSCOPY    Location: [facility]            Relevant Problems   Cardiac   (+) Dyslipidemia, goal LDL below 100      Pulmonary   (+) HERIBERTO (obstructive sleep apnea)      Neuro   (+) Carpal tunnel syndrome of right wrist   (+) Generalized anxiety disorder      GI   (+) GERD (gastroesophageal reflux disease)   (+) Hernia, hiatal      Liver   (+) Cholelithiasis      Endocrine   (+) Hypothyroidism      Musculoskeletal   (+) Carpal tunnel syndrome of right wrist   (+) Neck pain, chronic   (+) Primary osteoarthritis of first carpometacarpal joint of left hand      HEENT   (+) Sensorineural hearing loss (SNHL) of right ear with unrestricted hearing of left ear       Clinical information reviewed:   Tobacco  Allergies  Meds   Med Hx  Surg Hx   Fam Hx  Soc Hx        NPO Detail:  NPO/Void Status  Date of Last Liquid: 11/04/24  Time of Last Liquid: 0900  Date of Last Solid: 11/02/24  Time of Last Solid: 1900         Physical Exam    Airway  Mallampati: II  TM distance: >3 FB  Neck ROM: full     Cardiovascular - normal exam     Dental - normal exam     Pulmonary - normal exam     Abdominal - normal exam       Other findings: USES CPAP      Anesthesia Plan    History of general anesthesia?: yes  History of complications of general anesthesia?: no    ASA 2     MAC   (Patient positioned self to comfort prior to sedation administered; eyes closed; continuous monitoring.  Preoxygenated 2L prior to procedure.)  The patient is not a current smoker.    intravenous induction   Anesthetic plan and risks discussed with patient.    Plan discussed with CRNA.

## 2024-11-04 NOTE — ANESTHESIA POSTPROCEDURE EVALUATION
Patient: Alize Castano    Procedure Summary       Date: 11/04/24 Room / Location: [facility]    Anesthesia Start: 1335 Anesthesia Stop:     Procedure: COLONOSCOPY Diagnosis:       Change in bowel habits      Diarrhea, unspecified type      Abdominal discomfort      Abnormal CT of the abdomen    Scheduled Providers: Nikhil Watson MD; ANNEL Valentino Responsible Provider: ANNEL Valentino    Anesthesia Type: MAC ASA Status: 2            Anesthesia Type: MAC    Vitals Value Taken Time   /60 11/04/24 1407   Temp 36.7 11/04/24 1407   Pulse 75 11/04/24 1407   Resp 17 11/04/24 1407   SpO2 94 11/04/24 1407       Anesthesia Post Evaluation    Patient location during evaluation: bedside  Patient participation: waiting for patient participation  Level of consciousness: awake  Pain score: 0  Pain management: adequate  Airway patency: patent  Cardiovascular status: acceptable  Respiratory status: acceptable and room air  Hydration status: acceptable  Postoperative Nausea and Vomiting: none      No notable events documented.

## 2024-11-16 DIAGNOSIS — E78.00 PURE HYPERCHOLESTEROLEMIA: ICD-10-CM

## 2024-11-16 RX ORDER — EZETIMIBE 10 MG/1
10 TABLET ORAL EVERY EVENING
Qty: 90 TABLET | Refills: 1 | Status: SHIPPED | OUTPATIENT
Start: 2024-11-16

## 2024-11-19 ENCOUNTER — APPOINTMENT (OUTPATIENT)
Dept: PRIMARY CARE | Facility: CLINIC | Age: 64
End: 2024-11-19
Payer: MEDICARE

## 2024-12-03 NOTE — PROGRESS NOTES
Subjective   Patient ID: Alize Castano is a 64 y.o. female who presents for her 2 month follow up multiple medical conditions             She is aware of her weight gain. She joined a fitness center.   She has not been able to start exercising like she would like because she is caring for her mother     She feels the combination of medications she is taking is working well for her  She feels calmer, more patient and less angry.    She tried tapering off omeprazole but her Sx worsened   She is back on omeprazole indefinitely             HEALTH:  PAP 2013, 2/18 - and no longer need to repeat   Mammo 10/18, 9/20, 11/21, 11/22, 10/24   BD 9/12, 4/19 T-0.9, 10/24 T-1.3  Colon 2008, 7/15 nl, 6/21 nl, 11/24 few small diverticula, internal hemorrhoids and Q 7   Ca cardiac score 6/12 was ZERO, 9/24 score is 1.   EKG 6/20, 6/21, 2/23, 8/23, 9/24   U/A 1/17, 2/18, 3/19, 6/20, 8/21   Hep C negative 11/21   FLU  9/19, 9/20, 10/21, 11/22, 9/24   TDAP 2008, 5/19  Prevnar 20 recommended but wants to wait 8/23  Pneumovax never   Zostavax 5/15    Shingrix 6/23/2020 and 9/22/2020  Pfizer CVD vaccine 3/6/2021 and 3/27/2021 and declines more   Ophth She wears glasses. No glaucoma or MD. She has the beginnings of cataracts that is being observed for now.        Review of Systems  All systems negative except those listed in the HPI      Past Medical, Surgical, and Family History reviewed and updated in chart.  Reviewed all medications by prescribing practitioner or clinical pharmacist   (such as prescriptions, OTCs, herbal therapies and supplements) and documented in the medical record      Objective   Visit Vitals  /78   Pulse 72   Temp 36.3 °C (97.4 °F)    Body mass index is 28.07 kg/m².      Physical Exam  Vitals reviewed.   Constitutional:       Appearance: Normal appearance.   HENT:      Head: Normocephalic and atraumatic.      Right Ear: Tympanic membrane, ear canal and external ear normal.      Left Ear: Tympanic membrane,  ear canal and external ear normal.      Nose: Nose normal.      Mouth/Throat:      Mouth: Mucous membranes are dry.      Pharynx: Oropharynx is clear.   Eyes:      Conjunctiva/sclera: Conjunctivae normal.   Cardiovascular:      Rate and Rhythm: Normal rate and regular rhythm.      Pulses: Normal pulses.      Heart sounds: Normal heart sounds.   Pulmonary:      Effort: Pulmonary effort is normal.      Breath sounds: Normal breath sounds.   Abdominal:      General: Bowel sounds are normal.      Palpations: Abdomen is soft.   Musculoskeletal:         General: Normal range of motion.      Cervical back: Normal range of motion and neck supple.   Skin:     General: Skin is warm.   Neurological:      General: No focal deficit present.      Mental Status: She is alert and oriented to person, place, and time.   Psychiatric:         Mood and Affect: Mood normal.         Behavior: Behavior normal.         Thought Content: Thought content normal.         Judgment: Judgment normal.       Assessment/Plan   Problem List Items Addressed This Visit       Chronic constipation    Elevated blood pressure reading - Primary    Enteritis    GERD (gastroesophageal reflux disease)    Hypothyroidism    Insulin resistance syndrome    HERIBERTO (obstructive sleep apnea)         Follow up completed         She is in a committed relationship, no children.   She denies previous history of tobacco use  She still plays softball in the summer   Her mom is 90 + y/o. Her dad passed away in 2/17 from lymphoma    She is a retired .        Seen in the ED 9/22/2024 for enteritis:   CT Abd 9/24 negative for obstruction and revealed likely finding related to enteritis.   Hypokalemia is replenished. Patient is discharged home to increase oral fluid intake, discharged on Zofran with instruction to take Motrin/Tylenol for pain control   She saw SHERI Rausch in 10/24 and continue Benefiber, Obtain stool pathogens testing and fecal calprotectin and schedule  colonoscopy   Continue Miralax 1 scoop BID           She saw LUCA Sterling in 11/24 for GI issues and healthier eating discussed and for viral            illness- ivermectin (Stromectol) 3 MG tablet; Take 4 tablets (12 mg) by mouth Daily            as needed (illness) for up to 30 doses  Colon 11/24 few small diverticula, internal hemorrhoids and Q 7   Recommend she avoid night shade fruits and vegetables- foods discussed 10/24     She saw Dr Mcdonald on 8/23/18 for hearing evaluation   Ruled out Menieres disease. She has intermittent tinnitus   Continue vitamin B2 2-3 times a week and Rx given for Antivert.   She saw Dr Ozuna on 9/24/19. She has sensorineural hearing loss of the right ear with unrestricted hearing of left ear.      Her weight in office is 184 with BMI of 28.07. We spent 15 minutes discussing diet and weight loss. The struggle of weight loss persists   Explained goal for BMI to be 25 or less   Recommend she look into a plant based/ whole foods diet      HTN- BP stable.   She stopped Triamterene/HCTZ 37.5/25 mg.   She can stay off the diuretic for now.   EKG 9/24 NSR   ECHO 2/2023 LV systolic function normal with EF 65 -70%   Stress test 4/2023 no evidence for inducible ischemia or previous myocardial scarring, with normal left and right ventricular size and systolic function and preserved EF  Recommend she monitor her BP at home and call with elevated readings   She saw Dr Delarosa in 4/2023 and only needs to follow prn         I have spent 15 min face to face with this patient discussing their cardiac risk   We discussed the patients cardiovascular risk. If needed, lifestyle modifications recommended including: behavioral therapies of nutrition choices, exercise and eliminate habits that are contributing to their cardiac risk. We agreed to a plan to decrease his cardiovascular risks. Discussed ASA. Reviewed Guidelines and approved recommendations made to patient.   The patient's 10 yr CV risk was estimated  at  8 % 12/2024      Elevated Lipids:  and HDL 71 on labs in 9/24.   Explained goal for LDL to be less than 100 and ideally less than 70   Continue ezetimibe 10 mg daily   She failed Lipitor due to muscle pain.    Ca cardiac score 9/24 score was 1.   Recommend she look into a plant based/ whole foods diet       Hypothyroid: TSH 0.89 on labs in 9/2024   Continue BRANDED Synthroid 100 mcg daily.      Hypokalemia: levels stable on labs in 9/24.    Continue potassium chloride 10 mEq QOD      Migrainous vertigo:   She is no longer taking sumatriptan    She has Zofran prn nausea and Meclizine prn vertigo.   Continue Nasonex daily and Mg 250 mg 2 tablets at bedtime   She could not tolerate riboflavin.   MRI brain 9/18 nodular lesion along the superior margin of the pituitary gland extending cranially into the suprasellar region along the right ventral margin measuring approx 9 mm x 5 mm in transaxial dimensions, compared this to MRI done 5/21/2009 and appeared to be unchanged.   Continue acupuncture and massage therapy for migraines   She has a Medrol Dose Jovon on hand to use if she can not break the migraine   She saw Dr Matthews in 8/2024     Forgetfulness and feeling distracted :   I feel this is medication related vs age related   Her stress levels contribute to this  MRI of the brain on 6/2021 stable compared to previous studies   Nodule is stable near pituitary area and not affecting anything      GERD: She tried tapering off omeprazole but her Sx worsened. She is back on omeprazole indefinitely.   Continue omeprazole 40 mg daily. She needs to be on this for life   EGD 8/2016 was normal   She saw Dr Dixon in 5/2021      Anxiety and depression: She feels the combination of medications she is taking is working well for her. She feels calmer, more patient and less angry.  She has increased stressors taking care of her mother   Her mom is 90 + y/o. Her dad passed away in 2/17 from lymphoma   She retired from her job  8/1/18.   We will discontinue fluoxetine due to feeling inpatient and having vivid dreams   Continue Pristiq 50 mg daily and Abilify 2 mg daily      IBS- C:   Continue fiber through food daily   She did not start the FODMAP diet, she found it to be too restrictive.  Colon 11/24 few small diverticula, internal hemorrhoids and Q 7    She saw Dr Dixon in 5/2021      Right shoulder and neck pain:  She completed PT with good results   PT helped with all the HA and vertigo issues as well   She saw ortho, no recommendations for sx.     Right carpal tunnel:  Xrays 3/2021 degenerative changes, most severely at the carpometacarpal joint   She would like to hold off of surgery for now    Continue wearing the wrist brace at night    She saw Dr Akbar in 3/2021      HERIBERTO Dx in 9/17 :  (diagnosed by [facility] at  7/2017 but report is not available for review)    She is wearing her CPAP nightly   She has an appt with the sleep specialist in 12/24       Eczema and itchy rash during winter months  Continue mometasone cream prn and this seems to help   She sees derm yearly      Vitamin D deficiency- Levels 80 on labs in 9/24  She feels more comfortable when her levels are above 50  Discontinue scripted Vitamin D due to concerns of bone loss with higher doses  Continue OTC Vitamin D3 5000 UT daily      PAP normal 2018 and this was her last one   She had an ovarian cyst and saw Dr Carr   Pelvic U/S 9/2013 right cyst is resolved and no other abnormalities      Mammo normal 10/24   Breast exam normal except scars present from breast reduction surgery in the past 8/2023    BD 10/24 T-1.3.   Continue 1 ES Tums daily and OTC Vitamin D3 5000 UT daily and eat 2 servings of calcium enriched foods daily.   Discussed importance of weight bearing exercises        Colonoscopy 11/24 few small diverticula, internal hemorrhoids and Q 7      Ophth:   She wears glasses. No glaucoma or MD. She has the beginnings of cataracts that is being observed for  now.      She has a living will and MPOA. Recommend she bring a copy of her Advanced Directives in office to have scanned in her chart 8/2023     Hep C negative 11/21   FLU  9/19, 9/20, 10/21, 11/22, 9/24   TDAP 2008, 5/19  Prevnar 20 recommended but wants to wait 8/23   Pneumovax never   Zostavax 5/15    Shingrix 6/23/2020 and 9/22/2020  Pfizer CVD vaccine 3/6/2021 and 3/27/2021 and declines more      Some elements in the chart were copied from Dr. Sánchez's last office visit with patient. Notes have been updated where appropriate, and reflect my current medical decision making from today.      Keep scheduled appt in 2/25 for follow up or sooner if needed   (MCR due 9/2025, last mcr 9/10/24 Dr Colón)      Scribe Attestation  By signing my name below, I, Teri Galvan , Scribe   attest that this documentation has been prepared under the direction and in the presence of Jennifer Sánchez MD.

## 2024-12-04 ENCOUNTER — APPOINTMENT (OUTPATIENT)
Dept: PRIMARY CARE | Facility: CLINIC | Age: 64
End: 2024-12-04
Payer: MEDICARE

## 2024-12-04 VITALS
DIASTOLIC BLOOD PRESSURE: 78 MMHG | OXYGEN SATURATION: 99 % | HEIGHT: 68 IN | SYSTOLIC BLOOD PRESSURE: 126 MMHG | WEIGHT: 184.6 LBS | TEMPERATURE: 97.4 F | HEART RATE: 72 BPM | BODY MASS INDEX: 27.98 KG/M2

## 2024-12-04 DIAGNOSIS — E88.810 INSULIN RESISTANCE SYNDROME: ICD-10-CM

## 2024-12-04 DIAGNOSIS — K52.9 ENTERITIS: Primary | ICD-10-CM

## 2024-12-04 DIAGNOSIS — E03.9 HYPOTHYROIDISM, UNSPECIFIED TYPE: ICD-10-CM

## 2024-12-04 DIAGNOSIS — F41.1 GENERALIZED ANXIETY DISORDER: ICD-10-CM

## 2024-12-04 DIAGNOSIS — K21.00 GASTROESOPHAGEAL REFLUX DISEASE WITH ESOPHAGITIS WITHOUT HEMORRHAGE: ICD-10-CM

## 2024-12-04 DIAGNOSIS — G47.33 OSA (OBSTRUCTIVE SLEEP APNEA): ICD-10-CM

## 2024-12-04 DIAGNOSIS — K59.09 CHRONIC CONSTIPATION: ICD-10-CM

## 2024-12-04 DIAGNOSIS — R03.0 ELEVATED BLOOD PRESSURE READING: ICD-10-CM

## 2024-12-04 PROCEDURE — 1160F RVW MEDS BY RX/DR IN RCRD: CPT | Performed by: INTERNAL MEDICINE

## 2024-12-04 PROCEDURE — 99214 OFFICE O/P EST MOD 30 MIN: CPT | Performed by: INTERNAL MEDICINE

## 2024-12-04 PROCEDURE — 1123F ACP DISCUSS/DSCN MKR DOCD: CPT | Performed by: INTERNAL MEDICINE

## 2024-12-04 PROCEDURE — 1159F MED LIST DOCD IN RCRD: CPT | Performed by: INTERNAL MEDICINE

## 2024-12-04 PROCEDURE — 3008F BODY MASS INDEX DOCD: CPT | Performed by: INTERNAL MEDICINE

## 2024-12-04 PROCEDURE — 1036F TOBACCO NON-USER: CPT | Performed by: INTERNAL MEDICINE

## 2024-12-04 PROCEDURE — G2211 COMPLEX E/M VISIT ADD ON: HCPCS | Performed by: INTERNAL MEDICINE

## 2024-12-05 ENCOUNTER — APPOINTMENT (OUTPATIENT)
Dept: GASTROENTEROLOGY | Facility: CLINIC | Age: 64
End: 2024-12-05
Payer: MEDICARE

## 2024-12-10 ENCOUNTER — TELEPHONE (OUTPATIENT)
Dept: PRIMARY CARE | Facility: CLINIC | Age: 64
End: 2024-12-10
Payer: MEDICARE

## 2024-12-10 DIAGNOSIS — R05.2 SUBACUTE COUGH: Primary | ICD-10-CM

## 2024-12-10 RX ORDER — BENZONATATE 200 MG/1
200 CAPSULE ORAL 3 TIMES DAILY PRN
Qty: 42 CAPSULE | Refills: 0 | Status: SHIPPED | OUTPATIENT
Start: 2024-12-10 | End: 2025-01-09

## 2024-12-11 ENCOUNTER — APPOINTMENT (OUTPATIENT)
Dept: PRIMARY CARE | Facility: CLINIC | Age: 64
End: 2024-12-11
Payer: MEDICARE

## 2024-12-18 DIAGNOSIS — R30.0 DYSURIA: Primary | ICD-10-CM

## 2024-12-18 DIAGNOSIS — R69 ILLNESS ASSOCIATED WITH TRAVEL: Primary | ICD-10-CM

## 2024-12-18 RX ORDER — DOXYCYCLINE 100 MG/1
100 CAPSULE ORAL 2 TIMES DAILY
Qty: 28 CAPSULE | Refills: 0 | Status: SHIPPED | OUTPATIENT
Start: 2024-12-18 | End: 2025-01-01

## 2024-12-18 RX ORDER — OSELTAMIVIR PHOSPHATE 75 MG/1
75 CAPSULE ORAL 2 TIMES DAILY
Qty: 10 CAPSULE | Refills: 0 | Status: SHIPPED | OUTPATIENT
Start: 2024-12-18 | End: 2024-12-23

## 2024-12-18 RX ORDER — NITROFURANTOIN 25; 75 MG/1; MG/1
100 CAPSULE ORAL 2 TIMES DAILY
Qty: 14 CAPSULE | Refills: 0 | Status: SHIPPED | OUTPATIENT
Start: 2024-12-18 | End: 2024-12-25

## 2024-12-18 NOTE — ED TRIAGE NOTES
Name: Papo Gibbs      : 1971      MRN: [id number]  Encounter Provider: Rikki Estrella DO  Encounter Date: 2024   Encounter department: [facility]  :  Assessment & Plan  Right-sided thoracic back pain, unspecified chronicity  Tolerated Oxycodone 2.5mg and 5mg while hospitalized in his recent admission from 2024 to 2024.    Patient with continued pain to the right side of his thoracic region that remains from his hospital stay.    Plan: start Oxycodone IR for cancer related pain   1) OxyIR 2.5mg q4 hours PRN for moderate pain  -or OxyIR 5mg q4 hours PRN for severe pain    2) Bowel regimen to avoid OIC  -Senna provided - 1 tablet qHS PRN to avoid constipation    3) encouraged adequate PO intake with fluids and nutrition    Medication safety issues - Do not drive under the influence of narcotics (including opioids), watch for adverse effects including confusion / altered mental status / respiratory depression (slowed breathing), keep medications stored in a safe/locked environment, do not use alcohol while opioids or other narcotics are in your system. Do not travel with more than the minimum number of tablets or capsules required for the trip.    ER Precautions  Watch for red flag symptoms including, but not limited to fevers, chills, chest pain, shortness of breath, intractable nausea/vomiting/diarrhea, or acute intractable pain (especially if pain is new or has changed).      Orders:    oxyCODONE (Roxicodone) 5 immediate release tablet; Take 0.5 tablets (2.5 mg total) by mouth every 4 (four) hours as needed for moderate pain If pain is severe, may instead take 1 tablet (5mg) every 4 hours as needed Max Daily Amount: 15 mg    naloxone (NARCAN) 4 mg/0.1 mL nasal spray; Administer 1 spray into a nostril. If no response after 2-3 minutes, give another dose in the other nostril using a new spray. For use in emergencies for opioid / pain medication reversal for accidental  PT. ARRIVED VIA EMS TO ED FROM HOME FOR ABD PAIN. PT. STATES DIARRHEA STARTED AROUND 0400, ABD PAIN AND N/V STARTED 1HR PTA. PT. STATES HX OF GALLBLADDER REMOVAL IN 2014. PT. STATES GENERALIZED UPPER ABD PAIN, IS 2/10, FEELS LIKE CRAMPING, LAST ATE AT 1900, LAST BM AT 2300 DESCRIBED: LOOSE/BROWN, ABD IS SOFT/DISTENDED, +BOWEL SOUNDS. PT. A&O X4, DENIES CP, SOB, HA, NUMBNESS/TINGLING, DIZZINESS/LIGHTHEADEDNESS. PT. ON CARDIAC MONITOR READING NSR W/ HR OF 72. PT. BREATHING UNLABORED, SPEAKING IN FULL SENTENCES, B/L LUNG SOUNDS CLEAR, PULSE OX 98% ON RA.   ingestion, respiratory depression, sedation or concerns for overdose.    senna (SENOKOT) 8.6 mg; Take 1 tablet (8.6 mg total) by mouth daily at bedtime as needed for constipation    Adenocarcinoma of overlapping sites of right lung (HCC)  Following Medical Oncology - Dr. Sandoval.  Patient also to have follow-up with Pulmonology given recent hospitalization requiring pleural catheter and home supplemental O2 (chronically on 2L).      Orders:    oxyCODONE (Roxicodone) 5 immediate release tablet; Take 0.5 tablets (2.5 mg total) by mouth every 4 (four) hours as needed for moderate pain If pain is severe, may instead take 1 tablet (5mg) every 4 hours as needed Max Daily Amount: 15 mg    naloxone (NARCAN) 4 mg/0.1 mL nasal spray; Administer 1 spray into a nostril. If no response after 2-3 minutes, give another dose in the other nostril using a new spray. For use in emergencies for opioid / pain medication reversal for accidental ingestion, respiratory depression, sedation or concerns for overdose.    senna (SENOKOT) 8.6 mg; Take 1 tablet (8.6 mg total) by mouth daily at bedtime as needed for constipation    History of pulmonary embolism    Orders:    oxyCODONE (Roxicodone) 5 immediate release tablet; Take 0.5 tablets (2.5 mg total) by mouth every 4 (four) hours as needed for moderate pain If pain is severe, may instead take 1 tablet (5mg) every 4 hours as needed Max Daily Amount: 15 mg    naloxone (NARCAN) 4 mg/0.1 mL nasal spray; Administer 1 spray into a nostril. If no response after 2-3 minutes, give another dose in the other nostril using a new spray. For use in emergencies for opioid / pain medication reversal for accidental ingestion, respiratory depression, sedation or concerns for overdose.    senna (SENOKOT) 8.6 mg; Take 1 tablet (8.6 mg total) by mouth daily at bedtime as needed for constipation    Palliative care patient  Psychosocial   Supportive listening provided  Normalized experience of  patient/family  Provided anxiety containment     Referrals Placed / Medical Equipment Ordered  -None today    Follow-Up Recommendations  -Follow-up with PCP and current medical specialists  -Follow-up with palliative care: 2 weeks for recheck on symptoms including pain    Orders:    oxyCODONE (Roxicodone) 5 immediate release tablet; Take 0.5 tablets (2.5 mg total) by mouth every 4 (four) hours as needed for moderate pain If pain is severe, may instead take 1 tablet (5mg) every 4 hours as needed Max Daily Amount: 15 mg    naloxone (NARCAN) 4 mg/0.1 mL nasal spray; Administer 1 spray into a nostril. If no response after 2-3 minutes, give another dose in the other nostril using a new spray. For use in emergencies for opioid / pain medication reversal for accidental ingestion, respiratory depression, sedation or concerns for overdose.    senna (SENOKOT) 8.6 mg; Take 1 tablet (8.6 mg total) by mouth daily at bedtime as needed for constipation        Decisional apparatus: Patient is competent on my exam today. If competence is lost, patient's substitute decision maker would default to spouse by [place] Act 169.     PDMP Review: I have reviewed the patient's controlled substance dispensing history in the Prescription Drug Monitoring Program in compliance with the [facility] regulations before prescribing any controlled substances.    History of Present Illness   Papo Gibbs is a 53 y.o. male who presents for follow-up.    Palliative Diagnosis - Adenocarcinoma of right lung    Patient is seen today in office. Alert, oriented, pleasantly conversant.  Patient is seen accompanied by his spouse.   Patient is on supplemental O2 via NC with his O2 tank. Does have some mild conversational dyspnea but patient states he is able to catch his breath and does not feel short of breath when he is sitting or relaxed. He has ongoing tachycardia since his hospital admission of 120-130 bpm (patient was evaluated for this during that admission and was  recommended to have continued observation). No acute chest pain or palpitations today. Patient does endorse exertional dyspnea for which he feels he has become more deconditioned from weeks prior. He does voice pain particularly to the right posterior thoracic region as he was s/p IR thoracentesis during his hospitalization with 1.5L removed. Patient was noted to have pleural effusions and therefore, ASEPT catheter was placed.     Appetite has been fair.    Pain - difficult to manage at this time and was started on Oxycodone while he was hospitalized with good effect. Was receiving 2.5 mg to 5 mg dosing. Patient is open to a script today for this given his level of pain.  Will start OxyIR q4 hours PRN with 2.5mg and 5mg dosing for moderate to severe pain respectively. Precautions reviewed with patient and spouse during this visit. Bowel regimen PRN to avoid OIC.    Patient is well supported by his spouse and family.      Medical History Reviewed by provider this encounter:     .  Past Medical History   Past Medical History:   Diagnosis Date    Cancer (HCC) 04,25,2024    Diabetes mellitus (HCC)     High cholesterol     History of blood clots     Hypertension     Leucocytosis 04/20/2024    Lung cancer (HCC)     Malignant neoplasm of overlapping sites of right lung (HCC) 06/04/2024    Pneumonia     Pulmonary embolism (HCC)      Past Surgical History:   Procedure Laterality Date    IR BIOPSY LYMPH NODE  4/23/2024    IR PORT PLACEMENT  5/20/2024    IR THORACENTESIS  12/9/2024    KNEE SURGERY      MANDIBLE FRACTURE SURGERY  1985    PATELLA FRACTURE SURGERY      RECTAL SURGERY       Family History   Problem Relation Age of Onset    No Known Problems Father     No Known Problems Mother       reports that he quit smoking about 8 months ago. His smoking use included cigarettes. He has a 52.5 pack-year smoking history. He has been exposed to tobacco smoke. He has never used smokeless tobacco. He reports that he does not  currently use alcohol. He reports that he does not use drugs.  Current Outpatient Medications on File Prior to Visit   Medication Sig Dispense Refill    acetaminophen (TYLENOL) 325 mg tablet Take 3 tablets (975 mg total) by mouth every 8 (eight) hours as needed for mild pain, headaches or fever      albuterol (ProAir HFA) 90 mcg/act inhaler Inhale 2 puffs every 6 (six) hours as needed for wheezing 8.5 g 2    amitriptyline (ELAVIL) 100 mg tablet 200 mg daily at bedtime      apixaban (Eliquis) 5 mg Take 1 tablet (5 mg total) by mouth 2 (two) times a day 60 tablet 5    atorvastatin (LIPITOR) 10 mg tablet Take 1 tablet (10 mg total) by mouth daily 90 tablet 3    benzonatate (TESSALON) 200 MG capsule Take 1 capsule (200 mg total) by mouth 3 (three) times a day as needed for cough 20 capsule 0    famotidine (PEPCID) 40 MG tablet Take 40 mg by mouth daily as needed for heartburn or indigestion Taking as needed      folic acid (FOLVITE) 1 mg tablet TAKE 1 TABLET BY MOUTH EVERY DAY 90 tablet 2    glimepiride (AMARYL) 2 mg tablet TAKE 1 TABLET BY MOUTH DAILY WITH BREAKFAST 90 tablet 1    Lancets (OneTouch Delica Plus [id number]) MISC Use 1 Units 4 (four) times a day 400 each 2    levothyroxine 25 mcg tablet TAKE 1 TABLET BY MOUTH EVERY DAY 90 tablet 2    losartan (COZAAR) 25 mg tablet Take 25 mg by mouth daily      metFORMIN (GLUCOPHAGE) 1000 MG tablet Take 1,000 mg by mouth 2 (two) times a day      ondansetron (ZOFRAN-ODT) 8 mg disintegrating tablet Take 1 tablet (8 mg total) by mouth every 8 (eight) hours as needed for vomiting or nausea 30 tablet 0    OneTouch Verio test strip Use 1 each 4 (four) times a day 400 each 2    tiotropium-olodaterol (Stiolto Respimat) 2.5-2.5 MCG/ACT inhaler Inhale 2 puffs daily 4 g 3    Glucagon, rDNA, (Glucagon Emergency) 1 MG KIT Inject 1 mg as directed once as needed (hypoglycemia) for up to 1 dose (Patient not taking: Reported on 12/18/2024) 2 kit 0    meclizine (ANTIVERT) 25 mg tablet Take  1 tablet (25 mg total) by mouth every 8 (eight) hours as needed for dizziness (Patient not taking: Reported on 12/18/2024) 30 tablet 0     No current facility-administered medications on file prior to visit.   No Known Allergies   Current Outpatient Medications on File Prior to Visit   Medication Sig Dispense Refill    acetaminophen (TYLENOL) 325 mg tablet Take 3 tablets (975 mg total) by mouth every 8 (eight) hours as needed for mild pain, headaches or fever      albuterol (ProAir HFA) 90 mcg/act inhaler Inhale 2 puffs every 6 (six) hours as needed for wheezing 8.5 g 2    amitriptyline (ELAVIL) 100 mg tablet 200 mg daily at bedtime      apixaban (Eliquis) 5 mg Take 1 tablet (5 mg total) by mouth 2 (two) times a day 60 tablet 5    atorvastatin (LIPITOR) 10 mg tablet Take 1 tablet (10 mg total) by mouth daily 90 tablet 3    benzonatate (TESSALON) 200 MG capsule Take 1 capsule (200 mg total) by mouth 3 (three) times a day as needed for cough 20 capsule 0    famotidine (PEPCID) 40 MG tablet Take 40 mg by mouth daily as needed for heartburn or indigestion Taking as needed      folic acid (FOLVITE) 1 mg tablet TAKE 1 TABLET BY MOUTH EVERY DAY 90 tablet 2    glimepiride (AMARYL) 2 mg tablet TAKE 1 TABLET BY MOUTH DAILY WITH BREAKFAST 90 tablet 1    Lancets (OneTouch Delica Plus [id number]) MISC Use 1 Units 4 (four) times a day 400 each 2    levothyroxine 25 mcg tablet TAKE 1 TABLET BY MOUTH EVERY DAY 90 tablet 2    losartan (COZAAR) 25 mg tablet Take 25 mg by mouth daily      metFORMIN (GLUCOPHAGE) 1000 MG tablet Take 1,000 mg by mouth 2 (two) times a day      ondansetron (ZOFRAN-ODT) 8 mg disintegrating tablet Take 1 tablet (8 mg total) by mouth every 8 (eight) hours as needed for vomiting or nausea 30 tablet 0    OneTouch Verio test strip Use 1 each 4 (four) times a day 400 each 2    tiotropium-olodaterol (Stiolto Respimat) 2.5-2.5 MCG/ACT inhaler Inhale 2 puffs daily 4 g 3    Glucagon, rDNA, (Glucagon Emergency) 1 MG  "KIT Inject 1 mg as directed once as needed (hypoglycemia) for up to 1 dose (Patient not taking: Reported on 2024) 2 kit 0    meclizine (ANTIVERT) 25 mg tablet Take 1 tablet (25 mg total) by mouth every 8 (eight) hours as needed for dizziness (Patient not taking: Reported on 2024) 30 tablet 0     No current facility-administered medications on file prior to visit.      Social History     Tobacco Use    Smoking status: Former     Current packs/day: 0.00     Average packs/day: 1.5 packs/day for 35.0 years (52.5 ttl pk-yrs)     Types: Cigarettes     Quit date: 4/15/2024     Years since quittin.6     Passive exposure: Past    Smokeless tobacco: Never   Vaping Use    Vaping status: Never Used   Substance and Sexual Activity    Alcohol use: Not Currently    Drug use: Never    Sexual activity: Yes     Partners: Female     Birth control/protection: Post-menopausal, None        Objective   /68 (BP Location: Left arm, Patient Position: Sitting, Cuff Size: Standard)   Pulse (!) 127   Temp (!) 97.4 °F (36.3 °C) (Temporal)   Ht 5' 7\" (1.702 m)   Wt 91.6 kg (202 lb)   SpO2 95%   BMI 31.64 kg/m²     Physical Exam    Recent labs:  Lab Results   Component Value Date/Time    SODIUM 131 (L) 2024 01:17 PM    K 3.9 2024 01:17 PM    BUN 16 2024 01:17 PM    CREATININE 0.64 2024 01:17 PM    GLUC 295 (H) 2024 01:17 PM    CALCIUM 8.4 2024 01:17 PM    AST 13 2024 01:17 PM    ALT 16 2024 01:17 PM    ALB 2.7 (L) 2024 01:17 PM    TP 5.8 (L) 2024 01:17 PM    EGFR 110 2024 01:17 PM     Lab Results   Component Value Date/Time    HGB 9.2 (L) 2024 01:17 PM    WBC 6.21 2024 01:17 PM     2024 01:17 PM    INR 1.35 (H) 2024 06:14 AM    PTT 30 2024 11:18 AM     Lab Results   Component Value Date/Time    [id number] 0.366 (L) 2024 02:53 PM       Recent Imaging:  Procedure: MRI Brain BT w wo Contrast  Result Date: " 12/16/2024  Narrative: MRI BRAIN WITH AND WITHOUT CONTRAST INDICATION: C79.31: Secondary malignant neoplasm of brain. Tumor Type: History of stage IV non-small cell lung cancer with left frontal lobe metastasis. Follow-up evaluation. Surgical History: Not available Radiation History: 7/3/2024 status post SRS Relevant Medications: Not applicable COMPARISON: 9/12/2024; 6/20/2024 TECHNIQUE: Multiplanar, multisequence imaging of the brain and sella was performed before and after gadolinium administration. IV Contrast:  10 mL of Gadobutrol injection (SINGLE-DOSE) IMAGE QUALITY:   Diagnostic. FINDINGS: BRAIN TUMOR TREATMENT BED: Small 5 mm focus of juxtacortical enhancement in the right frontal lobe in the region of the precentral gyrus has resolved. Currently there is no pathologic enhancement. Perfusion: No areas of increased parenchymal cerebral blood flow. Diffusion: No diffusion abnormality to suggest hypercellularity. OTHER FINDINGS: A few subtle subcortical white matter lesions predominantly in the frontal lobes are stable. No acute intracranial hemorrhage. No extra-axial fluid collection. Cerebellar tonsils normally positioned. VENTRICLES:  Normal for the patient's age. SELLA AND PITUITARY GLAND:  Normal. ORBITS:  Normal. PARANASAL SINUSES: Mild left maxillary sinus mucosal thickening. VASCULATURE:  Evaluation of the major intracranial vasculature demonstrates appropriate flow voids. CALVARIUM AND SKULL BASE:  Normal. EXTRACRANIAL SOFT TISSUES:  Normal.     Impression: 1. Status post treatment of right frontal brain metastasis. No metastases currently.  Continued clinical and imaging followup advised. 2. No acute infarction, intracranial image or mass. 3. A few white matter lesions may represent precocious microangiopathy, stable. Workstation performed: [id number]     Procedure: XR chest portable  Result Date: 12/12/2024  Narrative: XR CHEST PORTABLE INDICATION: post plural catheter placement. COMPARISON: Chest  x-ray December 10, 2024, CTA chest December 8, 2024 FINDINGS: Right central venous catheter tip terminates in the right atrium unchanged. Right pleural catheter has been placed. Pulmonary vascular congestion is again demonstrated with right pleural effusion. Diffuse reticular opacities at the level of the right upper lobe are again seen with perihilar and right lower lobe infiltrates. Normal cardiomediastinal silhouette. Bones are unremarkable for age. Normal upper abdomen.     Impression: No significant interval change, no pneumothorax. Status post pleural catheter placement. Workstation performed: [id number]     Procedure: Pleural catheter insertion  Result Date: 12/12/2024  Narrative: Watson Esteban MD     12/12/2024 10:26 AM Pleural catheter insertion Date/Time: 12/12/2024 10:24 AM Performed by: Watson Esteban MD Authorized by: Jennifer Portillo MD  Patient Location: bedside Universal Protocol: Consent obtained: written Consent given by: patient Patient states understanding of procedure being performed: yes  Patient's understanding of procedure matches consent: yes  Imaging studies available: yes  Ultrasound guidance: yes  Ultrasound image availability: images available in PACS Sterile ultrasound techniques: sterile gel and probe covers were used in ultrasound-guided pleural catheter insertion Patient identity confirmed: verbally with patient Time out: Immediately prior to the procedure a time out was called  Procedure Details: PRE OPERATIVE DIAGNOSIS:    Malignant Pleural Effusion LOCATION:   Right Hemithorax Consent: Informed consent was obtained. Risks of the procedure were discussed including, but not limited to: Infection, Bleeding, Pain, Pneumothorax and Injury to surrounding structures. Images reviewed prior to the procedure.  Final Time Out was performed. Right hemithorax evaluated with bedside ultrasound, fluid pocked identified and appropriate for TPC insertion. ASA: 3 MALLAMPATI SCORE:  I Analgesia:  Intradermal Lidocaine 25 cc. Was other anesthesia used? No. PROCEDURE:  The patient was placed on supine lateral decubitus position.  Using US guidance, pleural fluid pocket was successfully identified.  Site marked on skin. Site was prepped and draped in sterile fashion.  SQ tissue infiltrated with 1% lidocaine with epi.  The finder needle was advanced over rib - 8th, posterior axillary line.  Pleural fluid obtained successfully.  Finder needle was removed. The introducer needle was then advanced in similar location to finder and flash of pleural fluid obtained. Wire was easily advanced and introducer needle was removed. A 1cm skin incision at wire entry site.  An additional 1cm incision was made approximately 4cm anterior, interior to the wire insertion site. Using a trochar the catheter was tunneled under the skin from anterior to posterior incision sites.  The cuff was seated approximately 1cm beyond insertion site.  The pleural insertion tract was then serially dilated, and the dilatator/breakaway sheath was inserted.  The catheter was then inserted thru the breakaway sheath, as the sheath was removed.  The catheter was fully seated under the skin.  Using tunneled pleural catheter bottles 400 cc was drained.  Catheter was sutured in place - 1 suture(s) at wire insertion site and 1 at catheter insertion site.  Sterile dressing was then placed. Post procedure Chest X-ray was reviewed and showed Adequate catheter placement, without any apparent complications. COMPLICATIONS:  None ESTIMATED BLOOD LOSS:  Minimal RECOMMENDATIONS:   Drainage is recommended daily until output is less than 100cc for three consecutive drainage attempts.  Suture removal will be arranged in 7 days.     Procedure: US bedside procedure  Result Date: 12/12/2024  Narrative: [id number]    Procedure: XR chest portable  Result Date: 12/11/2024  Narrative: XR CHEST PORTABLE INDICATION: eval R effusion.  COMPARISON: None FINDINGS: Persistent opacification in the right lung with a persistent right pleural effusion. Increased interstitial markings in the left lung. Normal cardiomediastinal silhouette. Right-sided MediPort unchanged. Bones are unremarkable for age. Normal upper abdomen.     Impression: Persistent opacification in the right lung with a persistent right pleural effusion. Findings similar to the most recent study. Increased interstitial markings in the left lung, correlate for heart failure versus interstitial spread of tumor. Workstation performed: [id number]     Procedure: IR IN-Patient Thoracentesis  Result Date: 12/10/2024  Narrative: PROCEDURE: Ultrasound-guided right thoracentesis Procedural Personnel Resident physician: Kulwinder Ba MD, PGY-2 Attending physician(s): Brian De Oliveira MD I was present for and supervised the entirety of the procedure including all key and critical steps. Pre-procedure diagnosis: Right pleural effusion Post-procedure diagnosis: Same PROCEDURE SUMMARY: Ultrasound-guided thoracentesis PROCEDURE DETAILS: Pre-procedure Consent: Informed consent for the procedure including risks, benefits and alternatives was obtained and time-out was performed prior to the procedure. Preparation: The site was prepared and draped using maximal sterile barrier technique including cutaneous antisepsis. Anesthesia/sedation Level of anesthesia/sedation: No sedation Technique and Findings: Immediate pre-procedure ultrasound demonstrated large right and no left pleural effusion.  A safe percutaneous access site was identified. Local anesthetic was administered.  Under real-time ultrasound guidance, a 5 Fr MTEM Limited centesis needle and catheter were advanced percutaneously into the effusion.  A permanent image was saved.  The needle was removed and the catheter attached to suction.  A total of 1500 mL dark kayla fluid was removed.  Drainage was stopped at this point to reduce risks of  re-expansion pulmonary edema.  Pleural effusion remained. A sample of fluid was sent for laboratory analysis. The catheter was removed and a sterile dressing was applied. Contrast None. Radiation Dose None. Additional Details Specimens removed: Right pleural fluid Estimated blood loss (mL): Less than 10 Complications: No immediate complications.     Impression: Ultrasound-guided right thoracentesis, yielding a total of 1500 mL dark kayla fluid.  A sample was sent for laboratory analysis. Drainage was stopped to reduce risk of re-expansion pulmonary edema.  Pleural effusion remains present. Workstation performed: [id number]     Procedure: Thoracentesis (Bedside)  Result Date: 12/10/2024  Narrative: Watson Esteban MD     12/10/2024  4:42 PM Thoracentesis (Bedside) Date/Time: 12/10/2024 4:41 PM Performed by: Watson Esteban MD Authorized by: Watson Esteban MD  Patient location:  Bedside Consent:   Consent obtained:  Verbal   Consent given by:  Patient and spouse   Risks discussed:  Bleeding, incomplete drainage, nerve damage, pneumothorax, infection and pain   Alternatives discussed:  Alternative treatment and observation Universal protocol:   Patient identity confirmed:  Verbally with patient Indications:   Procedure Purpose: therapeutic    Indications: pleural effusion  Anesthesia (see MAR for exact dosages):   Anesthesia method:  Local infiltration   Local anesthetic:  Lidocaine 1% w/o epi Procedure details:   Standard thoracentesis cath kit used: Yes    Patient position:  Sitting   Laterality:  Right   Intercostal space:  8th   Number of attempts:  1   Drainage color:  Brown   Drainage characteristics:  Serosanguinous   Fluid removed amount:  1200 mL Post-procedure details:   Patient tolerance of procedure:  Tolerated well, no immediate complications    Procedure: XR chest pa and lateral  Result Date: 12/9/2024  Narrative: XR CHEST PA AND LATERAL INDICATION: SOB after thoracentesis. COMPARISON: December 8, 2024  FINDINGS : Decreased right effusion With residual right lower lung opacity due to small effusion and atelectasis consolidation. No pneumothorax seen Normal cardiomediastinal silhouette. Bones are unremarkable for age. Normal upper abdomen.     Impression: Increased right effusion and right lower lung opacity postthoracentesis No pneumothorax seen Workstation performed: [id number]     Procedure: Echo complete w/ contrast if indicated  Result Date: 12/9/2024  Narrative:   Left Ventricle: Left ventricular cavity size is normal. Wall thickness is mildly increased. There is mild concentric hypertrophy. The left ventricular ejection fraction is 60%. Systolic function is normal. Wall motion is normal. Diastolic function is normal.   Aortic Valve: There is mild regurgitation. There is aortic valve sclerosis.   Mitral Valve: There is mild annular calcification.     Procedure: CTA chest pe study  Result Date: 12/8/2024  Narrative: CTA - CHEST WITH IV CONTRAST - PULMONARY ANGIOGRAM INDICATION: tachycardia sob, cancer hx pe hx on eliquis.. Right lung cancer. COMPARISON: CT scan dated 10/25/2024 TECHNIQUE: CTA examination of the chest was performed using angiographic technique according to a protocol specifically tailored to evaluate for pulmonary embolism. Multiplanar 2D reformatted images were created from the source data. In addition, coronal  3D MIP postprocessing was performed on the acquisition scanner. This examination was performed utilizing dual energy CT technique. Radiation dose length product (DLP) for this visit: 369 mGy-cm . This examination, like all CT scans performed in the [facility], was performed utilizing techniques to minimize radiation dose exposure, including the use of iterative reconstruction and automated exposure control. IV Contrast: 60 mL of iohexol (OMNIPAQUE) FINDINGS: PULMONARY ARTERIAL TREE:  No pulmonary embolus. LUNGS: Increasing right lung atelectasis. Progressive  bronchovascular nodularity in the right lung suggestive of increasing lymphangitic tumor spread. There is intralobular septal thickening of the left lung with curly B lines. This pattern is suggestive  of interstitial edema. This finding is new compared to prior study. Developing lymphangitic tumor could also be within the differential. There is a minimal patchy opacity in the superior segment of the left lower lobe that may represent a small pneumonia. PLEURA: Partially loculated moderate right pleural effusion that has increased since prior study. HEART/GREAT VESSELS: Large amount of coronary calcification in the left anterior descending, left circumflex and right coronary arteries. No thoracic aortic aneurysm. Right jugular chest port present with its tip at the junction of the right atrium and superior vena cava. Right lung carcinomatosis has mass effect on the right superior and inferior pulmonary veins. MEDIASTINUM AND JEREMIAH: There are enlarged prevascular lymph nodes in the mediastinum that measure up to 1.2 x 1.3 cm on image 64 of series 301. These lymph nodes are without change. There is right hilar soft tissue suggestive of lymph node metastatic disease. This hilar tissue has increased compared to prior study. There are enlarged subcarinal lymph nodes measuring up to 1.2 cm that have increased in size. CHEST WALL AND LOWER NECK: Unremarkable. VISUALIZED STRUCTURES IN THE UPPER ABDOMEN: Calcified granuloma in the liver. Small lymph nodes within the gastrohepatic ligament that measure up to 9 mm. OSSEOUS STRUCTURES: Mild endplate degenerative changes present in the thoracic spine.     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 Increasing partially loculated moderate right pleural effusion. No pulmonary embolism. New interstitial edema in the left lung that is probably related to volume overload. Developing left lung carcinomatosis is also possible.  Attention to this finding on follow-up imaging advised Possible small pneumonia within the superior segment of the left lower lobe. Slightly increasing mediastinal lymphadenopathy that is probably metastatic. Enlarging subcentimeter gastrohepatic ligament lymph nodes that are probably metastatic. Workstation performed: [id number]     Procedure: XR chest portable  Result Date: 12/8/2024  Narrative: XR CHEST PORTABLE INDICATION: SOB. COMPARISON: Compared with 4/22/2024 FINDINGS: Right neck port catheter is in the cavoatrial region. Poor inspiration. Mild prominent markings on the left. Opacity obscuring most of the right lung parenchyma with obscuration of hemidiaphragm and costophrenic angle.. Normal cardiomediastinal silhouette. Bones are unremarkable for age. Normal upper abdomen.     Impression: Moderate to large right effusion with underlying parenchymal process. No mediastinal shift. Workstation performed: [id number]     Procedure: CT chest abdomen pelvis w contrast  Result Date: 10/30/2024  Narrative: CT CHEST, ABDOMEN AND PELVIS WITH IV CONTRAST INDICATION: C34.91: Malignant neoplasm of unspecified part of right bronchus or lung. COMPARISON: CT chest from 10/4/2024 and CT chest abdomen pelvis from August 2, 2024 TECHNIQUE: CT examination of the chest, abdomen and pelvis was performed. Multiplanar 2D reformatted images were created from the source data. This examination, like all CT scans performed [facility] the [facility], was performed utilizing techniques to minimize radiation dose exposure, including the use of iterative reconstruction and automated exposure control. Radiation dose length product (DLP) for this visit: 1096 mGy-cm IV Contrast: 100 mL of iohexol (OMNIPAQUE) Enteric Contrast: Not administered. FINDINGS: CHEST LUNGS: Primary lesion right middle lobe measures approximately 4.0 x 2.8 cm, stable allowing for differences in technique and slice selection. Significant progression of  lymphangitic tumor compared to August 2, 2024 and October 4, 2024, with worsening diffuse interlobular septal thickening throughout the right lung, peribronchovascular nodularity. PLEURA: Small right pleural effusion, new/increased compared to 10/4/2024. HEART/GREAT VESSELS: Heavy atherosclerotic coronary artery calcification. No thoracic aortic aneurysm. MEDIASTINUM AND JEREMIAH: Stable shotty prevascular lymph nodes. No new adenopathy. CHEST WALL AND LOWER NECK: Unremarkable. ABDOMEN LIVER/BILIARY TREE: Hepatic steatosis. GALLBLADDER: Contracted. SPLEEN: Unremarkable. PANCREAS: Unremarkable. ADRENAL GLANDS: Mild nodular thickening of the medial limb of the left adrenal gland, decreased since August 2, 2024 without measurable disease. Normal right adrenal gland. KIDNEYS/URETERS: Left lower pole renal calculus. No hydronephrosis or perinephric stranding. STOMACH AND BOWEL: Unremarkable. APPENDIX: No findings to suggest appendicitis. ABDOMINOPELVIC CAVITY: No ascites. No pneumoperitoneum. No lymphadenopathy. VESSELS: Atherosclerosis without abdominal aortic aneurysm. PELVIS REPRODUCTIVE ORGANS: Enlarged prostate. URINARY BLADDER: Normal. ABDOMINAL WALL/INGUINAL REGIONS: Small bilateral fat-containing inguinal hernias. BONES: No acute fracture or suspicious osseous lesion.     Impression: Worsening lymphangitic tumor progression compared to prior from 10/4/2024 and August 2, 2024. New/increased small right pleural effusion, probably malignant. Resolving nodular thickening medial limb left adrenal gland. No new metastatic disease in the abdomen or pelvis. Nonobstructing left nephrolithiasis. The study was marked in EPIC for significant notification. Workstation performed: [id number]     Procedure: POCT FeNO  Impression: Feno 16,  No indication of airway inflammation    Procedure: CTA chest pe study  Result Date: 10/4/2024  Narrative: CTA - CHEST WITH IV CONTRAST - PULMONARY ANGIOGRAM INDICATION:   C34.81: Malignant neoplasm  "of overlapping sites of right bronchus and lung I82.412: Acute embolism and thrombosis of left femoral vein I26.09: Other pulmonary embolism with acute cor pulmonale R06.09: Other forms of dyspnea. \"Acute shortness of breath with exertion.\" Per my review of the medical record, metastatic adenocarcinoma of the right lung diagnosed by left retroperitoneal lymph node biopsy on 4/23/2024. Treated with chemotherapy and radiation to C1. COMPARISON: Chest CT 8/2/2024 and 4/19/2024, CXR 4/22/2024. TECHNIQUE: CT angiogram timed for optimal opacification of the pulmonary arteries.  Axial, sagittal, and coronal 2D reformats created from source data.  Coronal 3D MIP postprocessing on the acquisition scanner. Radiation dose length product (DLP):  378.66 mGy-cm .  Radiation dose exposure minimized using iterative reconstruction and automated exposure control. IV Contrast:  50 mL of iohexol (OMNIPAQUE) FINDINGS: PULMONARY ARTERIES: Tiny barely conspicuous subsegmental pulmonary embolus in the right lower lobe (301/119-127. No additional acute pulmonary emboli. LUNGS: Stable 4.5 cm tumor in the right middle lobe abutting the minor fissure, possibly extending to the right upper lobe. Mild progression of thickening of the bronchovascular bundles and diffuse septal thickening in the right lung due to lymphangitic spread of tumor. Patchy new consolidation in the right lower lobe. Mild emphysema. AIRWAYS: No significant filling defects. PLEURA:  Unremarkable. HEART/GREAT VESSELS: Normal heart size. RV/LV diameter ratio less than 1 with nothing to indicate right heart strain. Moderate coronary artery calcification indicating atherosclerotic heart disease. MEDIASTINUM AND JEREMIAH: Right port in right atrium. Stable prevascular node at the upper limit of normal in size at 9 mm (301/49). CHEST WALL AND LOWER NECK: Unremarkable. UPPER ABDOMEN: Benign calcified hepatic granuloma OSSEOUS STRUCTURES:  Normal for age.     Impression: Tiny " subsegmental embolus in the right lower lobe. Given its small size, it is of doubtful clinical significance. Patchy new consolidation in the right lower lobe, infectious or inflammatory. Stable tumor in the right midlung with progression of lymphangitic spread throughout the right lung. I notified Dr. DAREN ELLISON on 10/4/2024 12:58 PM by [facility] secure chat and he responded immediately Workstation performed: [id number]     Procedure: MRI Brain BT w wo Contrast  Result Date: 9/13/2024  Narrative: MRI BRAIN WITH AND WITHOUT CONTRAST INDICATION: C79.31: Secondary malignant neoplasm of brain.   Additional history from [facility]: Brain metastasis s/p SRS. monitoring., Metastasis to brain (HCC) COMPARISON: MRI brain 6/20/2024 TECHNIQUE: Multiplanar, multisequence imaging of the brain and sella was performed before and after gadolinium administration. IV Contrast:  10 mL of Gadobutrol injection (SINGLE-DOSE) IMAGE QUALITY:   Diagnostic. FINDINGS: High right frontal lobe/precentral gyrus lesion, decreased in size, currently measuring 0.3 cm (previous 0.5 cm) (series 13: 22). There is no new pathologic area of intra-axial enhancement. Perfusion: No evidence of abnormal hyperperfusion Diffusion: No diffusion abnormality to suggest hypercellularity. OTHER FINDINGS: Chronic right caudate head lacunar infarct. Mild chronic microangiopathic ischemic changes. VENTRICLES:  Normal for the patient's age. SELLA AND PITUITARY GLAND:  Normal. ORBITS:  Normal. PARANASAL SINUSES:  Normal. VASCULATURE:  Evaluation of the major intracranial vasculature demonstrates appropriate flow voids. CALVARIUM AND SKULL BASE:  Normal. EXTRACRANIAL SOFT TISSUES:  Normal.     Impression: Previous high right frontal lobe/precentral gyrus lesion, decreased in size status post SRS compared to 6/20/2024. There is no new pathologic area of intra-axial enhancement. Mild chronic microangiopathic ischemic changes. Workstation performed: [id number]        Administrative Statements   I have spent a total time of 31 minutes in caring for this patient on the day of the visit/encounter including Risks and benefits of tx options, Instructions for management, Patient and family education, Importance of tx compliance, Risk factor reductions, Impressions, Counseling / Coordination of care, Documenting in the medical record, Reviewing / ordering tests, medicine, procedures  , and Obtaining or reviewing history  . Topics discussed with the patient / family include symptom assessment and management, medication review, medication adjustment, psychosocial support, opioid titration, supportive listening, and anticipatory guidance.

## 2025-02-11 NOTE — PROGRESS NOTES
Subjective   Patient ID: Alize Castano is a 64 y.o. female who presents for her 2 month follow up multiple medical conditions       She has gained 8 pounds since 12/24   She feels like she is ravenous but she is eating well.   She is going to get back into exercise     She had BLE edema with her flights to [place], it has since resolved     She has been on juice plus and she added prunes and her BM have been regular     She has not been sleeping well the past couple nights       HEALTH:  PAP 2013, 2/18 - and no longer need to repeat   Mammo 10/18, 9/20, 11/21, 11/22, 10/24   BD 9/12, 4/19 T-0.9, 10/24 T-1.3  Colon 2008, 7/15 nl, 6/21 nl, 11/24 few small diverticula, internal hemorrhoids and Q 7   Ca cardiac score 6/12 was ZERO, 9/24 score is 1.   EKG 6/20, 6/21, 2/23, 8/23, 9/24   U/A 1/17, 2/18, 3/19, 6/20, 8/21   Hep C negative 11/21   FLU  9/19, 9/20, 10/21, 11/22, 9/24   TDAP 2008, 5/19  Prevnar 20 recommended but wants to wait 8/23  Pneumovax never   Zostavax 5/15    Shingrix 6/23/2020 and 9/22/2020  Pfizer 3/6/2021 and 3/27/2021 and declines more   Ophth She wears glasses. No glaucoma or MD. She has the beginnings of cataracts that is being observed for now.        Review of Systems  All systems negative except those listed in the HPI        Objective   Visit Vitals  /84 (BP Location: Left arm, Patient Position: Sitting, BP Cuff Size: Adult)   Pulse 78   Temp 35.9 °C (96.6 °F) (Temporal)    Body mass index is 29.19 kg/m².     Physical Exam  Vitals reviewed.   Constitutional:       Appearance: Normal appearance.   HENT:      Head: Normocephalic.      Right Ear: Tympanic membrane, ear canal and external ear normal.      Left Ear: Tympanic membrane, ear canal and external ear normal.      Nose: Nose normal.      Mouth/Throat:      Pharynx: Oropharynx is clear.   Eyes:      Conjunctiva/sclera: Conjunctivae normal.   Cardiovascular:      Rate and Rhythm: Normal rate and regular rhythm.      Pulses: Normal  pulses.      Heart sounds: Normal heart sounds.   Pulmonary:      Effort: Pulmonary effort is normal.      Breath sounds: Normal breath sounds.   Abdominal:      General: Bowel sounds are normal.      Palpations: Abdomen is soft.   Musculoskeletal:         General: Normal range of motion.      Cervical back: Normal range of motion and neck supple.   Skin:     General: Skin is warm.   Neurological:      General: No focal deficit present.      Mental Status: She is alert and oriented to person, place, and time.   Psychiatric:         Mood and Affect: Mood normal.         Behavior: Behavior normal.         Thought Content: Thought content normal.         Judgment: Judgment normal.       Assessment/Plan   Problem List Items Addressed This Visit    None  Visit Diagnoses       Routine general medical examination at health care facility                Follow up completed  Reviewed her labs from 10/24       She is in a committed relationship, no children.   She denies previous history of tobacco use  She is a retired . She still plays softball in the summer   Her mom is 90 + y/o. Her dad passed away in 2/17 from lymphoma      She saw Dr Mcdonald on 8/23/18 for hearing evaluation   Ruled out Menieres disease. She has intermittent tinnitus   Continue vitamin B2 2-3 times a week and Rx given for Antivert.   She saw Dr Ozuna on 9/24/19. She has sensorineural hearing loss of the right ear with unrestricted hearing of left ear.      Her weight in office is 192 with BMI of 29.19. We spent 15 minutes discussing diet and weight loss. The struggle of weight loss persists   Explained goal for BMI to be 25 or less   She has gained 8 pounds since 12/24   Recommend she look into a plant based/ whole foods diet   Explained the Abilify is contributing to her weight gain   She is going to get back into exercise        HTN- BP stable.   She stopped Triamterene/HCTZ 37.5/25 mg.   She can stay off the diuretic for now.   EKG 9/24 NSR    ECHO 2/23 LV systolic function normal with EF 65 -70%   Stress test 4/23 no evidence for inducible ischemia or previous myocardial scarring, with normal left and right ventricular size and systolic function and preserved EF  Recommend she monitor her BP at home and call with elevated readings   She saw Dr Delarosa in 4/2023 and only needs to follow prn         I have spent 15 min face to face with this patient discussing their cardiac risk   We discussed the patients cardiovascular risk. If needed, lifestyle modifications recommended including: behavioral therapies of nutrition choices, exercise and eliminate habits that are contributing to their cardiac risk. We agreed to a plan to decrease his cardiovascular risks. Discussed ASA. Reviewed Guidelines and approved recommendations made to patient.   The patient's 10 yr CV risk was estimated at 6.3 % IO 2/25      Elevated Lipids:  and HDL 71 on labs in 9/24.   Explained goal for LDL to be less than 100 and ideally less than 70   Continue ezetimibe 10 mg daily   She failed Lipitor due to muscle pain.    Ca cardiac score 9/24 score was 1.   Recommend she look into a plant based/ whole foods diet       Hypothyroid: TSH 0.89 on labs in 9/2024   Continue BRANDED Synthroid 100 mcg daily.      Hypokalemia: levels stable on labs in 9/24.    Continue potassium chloride 10 mEq QOD      Migrainous vertigo:   Continue Nasonex daily and Mg 250 mg 2 tablets at bedtime   Continue acupuncture and massage therapy for migraines   She has a Medrol Dose Jovon on hand to use if she can not break the migraine   She has Zofran prn nausea and Meclizine prn vertigo.     She is no longer taking sumatriptan    She could not tolerate riboflavin.   MRI brain 9/18 nodular lesion along the superior margin of the pituitary gland extending cranially into the suprasellar region along the right ventral margin measuring approx 9 mm x 5 mm in transaxial dimensions, compared this to MRI done 5/21/2009  and appeared to be unchanged.   She saw Dr Matthews in 8/2024      Forgetfulness and feeling distracted :   I feel this is medication related vs age related   Her stress levels contribute to this as well  MRI of the brain on 6/2021 stable compared to previous studies   Nodule is stable near pituitary area and not affecting anything      GERD:    Continue omeprazole 40 mg daily. She needs to be on this for life   EGD 8/2016 was normal   She saw Dr Dixon in 5/2021       Anxiety and depression: She feels the combination of medications she is taking is working well for her. She feels calmer, more patient and less angry.  Continue Pristiq 50 mg daily and Abilify 2 mg daily    Discontinue fluoxetine due to feeling inpatient and having vivid dreams   She has increased stressors taking care of her mother   Her mom is 90 + y/o. Her dad passed away in 2/17 from lymphoma   She retired from her job 8/1/18.             Seen in the ED 9/22/2024 for enteritis: resolved with treatment            CT Abd 9/24 negative for obstruction and revealed likely finding related to enteritis.           She saw SHERI Rasuch in 10/24 and continue Benefiber, Obtain stool pathogens           testing and fecal calprotectin and schedule colonoscopy           Continue Miralax 1 scoop BID   She saw LUCA Sterling in 11/24 for GI issues and healthier eating discussed and for viral   illness- ivermectin (Stromectol) 3 MG tablet; Take 4 tablets (12 mg) by mouth Daily   as needed (illness) for up to 30 doses          Colon 11/24 few small diverticula, internal hemorrhoids and Q 7           Recommend she avoid night shade fruits and vegetables      IBS- C: She has been on juice plus and she added prunes and her BM have been regular. Recommend she do the juice plus and 3-5 prunes daily.   Continue fiber through food daily   She did not start the FODMAP diet, she found it to be too restrictive.  Colon 11/24 few small diverticula, internal hemorrhoids and Q 7    She  saw Dr Dixon in 5/2021      Right shoulder and neck pain:  She completed PT with good results   PT helped with all the HA and vertigo issues as well   She saw ortho, no recommendations for sx.     Right carpal tunnel:  Xrays 3/21 degenerative changes, most severely at the carpometacarpal joint   She would like to hold off of surgery for now    Continue wearing the wrist brace at night    She saw Dr Akbar in 3/2021      HERIBERTO Dx in 9/17 :  (diagnosed by [facility] at  7/2017 but report is not available for review)    She is wearing her CPAP nightly   She saw SHERI Menchaca in sleep medicine 12/24 May want to consider Inspire in the future      Eczema and itchy rash during winter months  Continue mometasone cream prn and this seems to help   She sees derm yearly      Vitamin D deficiency- Levels 80 on labs in 9/24  She feels more comfortable when her levels are above 50  Continue OTC Vitamin D3 5000 UT daily      PAP normal 2018 and this was her last one   She had an ovarian cyst and saw Dr Carr   Pelvic U/S 9/2013 right cyst is resolved and no other abnormalities      Mammo normal 10/24   Breast exam normal except scars present from breast reduction surgery in the past 9/24     BD 10/24 T-1.3. Continue 1 ES Tums daily and OTC Vitamin D3 5000 UT daily and eat 2 servings of calcium enriched foods daily.   Discussed importance of weight bearing exercises        Colonoscopy 11/24 few small diverticula, internal hemorrhoids and Q 7      Ophth:   She wears glasses. No glaucoma or MD. She has the beginnings of cataracts that is being observed for now.      She has a living will and MPOA. Recommend she bring a copy of her Advanced Directives in office to have scanned in her chart 8/2023     Hep C negative 11/21   FLU  9/19, 9/20, 10/21, 11/22, 9/24   TDAP 2008, 5/19  Prevnar 20 recommended but wants to wait 8/23  Pneumovax never   Zostavax 5/15    Shingrix 6/23/2020 and 9/22/2020  Pfizer 3/6/2021 and 3/27/2021 and declines  more      Some elements in the chart were copied from Dr. Sánchez's last office visit with patient. Notes have been updated where appropriate, and reflect my current medical decision making from today.      Keep scheduled appt in 2/25 for follow up or sooner if needed   (MCR due 9/2025, last mcr 9/10/24 Dr Colón)      Scribe Attestation  By signing my name below, I, Teri Cole , Scribe   attest that this documentation has been prepared under the direction and in the presence of Jennifer Sánchez MD.

## 2025-02-12 ENCOUNTER — APPOINTMENT (OUTPATIENT)
Dept: PRIMARY CARE | Facility: CLINIC | Age: 65
End: 2025-02-12
Payer: MEDICARE

## 2025-02-12 VITALS
BODY MASS INDEX: 29.1 KG/M2 | DIASTOLIC BLOOD PRESSURE: 84 MMHG | WEIGHT: 192 LBS | HEIGHT: 68 IN | SYSTOLIC BLOOD PRESSURE: 130 MMHG | TEMPERATURE: 96.6 F | OXYGEN SATURATION: 98 % | HEART RATE: 78 BPM

## 2025-02-12 DIAGNOSIS — E03.9 HYPOTHYROIDISM, UNSPECIFIED TYPE: ICD-10-CM

## 2025-02-12 DIAGNOSIS — K21.00 GASTROESOPHAGEAL REFLUX DISEASE WITH ESOPHAGITIS WITHOUT HEMORRHAGE: ICD-10-CM

## 2025-02-12 DIAGNOSIS — G43.119 INTRACTABLE MIGRAINE WITH AURA WITHOUT STATUS MIGRAINOSUS: ICD-10-CM

## 2025-02-12 DIAGNOSIS — K59.09 CHRONIC CONSTIPATION: ICD-10-CM

## 2025-02-12 DIAGNOSIS — R03.0 ELEVATED BLOOD PRESSURE READING: Primary | ICD-10-CM

## 2025-02-12 DIAGNOSIS — G47.33 OSA (OBSTRUCTIVE SLEEP APNEA): ICD-10-CM

## 2025-02-12 DIAGNOSIS — E88.810 INSULIN RESISTANCE SYNDROME: ICD-10-CM

## 2025-02-12 DIAGNOSIS — F41.1 GENERALIZED ANXIETY DISORDER: ICD-10-CM

## 2025-02-12 DIAGNOSIS — E87.6 HYPOKALEMIA: ICD-10-CM

## 2025-02-12 DIAGNOSIS — Z00.00 ROUTINE GENERAL MEDICAL EXAMINATION AT HEALTH CARE FACILITY: ICD-10-CM

## 2025-02-12 PROCEDURE — 99214 OFFICE O/P EST MOD 30 MIN: CPT | Performed by: INTERNAL MEDICINE

## 2025-02-12 PROCEDURE — 1160F RVW MEDS BY RX/DR IN RCRD: CPT | Performed by: INTERNAL MEDICINE

## 2025-02-12 PROCEDURE — 1123F ACP DISCUSS/DSCN MKR DOCD: CPT | Performed by: INTERNAL MEDICINE

## 2025-02-12 PROCEDURE — 1159F MED LIST DOCD IN RCRD: CPT | Performed by: INTERNAL MEDICINE

## 2025-02-12 PROCEDURE — 1036F TOBACCO NON-USER: CPT | Performed by: INTERNAL MEDICINE

## 2025-02-12 PROCEDURE — 3008F BODY MASS INDEX DOCD: CPT | Performed by: INTERNAL MEDICINE

## 2025-02-12 PROCEDURE — G2211 COMPLEX E/M VISIT ADD ON: HCPCS | Performed by: INTERNAL MEDICINE

## 2025-02-27 DIAGNOSIS — R42 VERTIGO: Primary | ICD-10-CM

## 2025-02-27 RX ORDER — MECLIZINE HYDROCHLORIDE 25 MG/1
25 TABLET ORAL 3 TIMES DAILY PRN
Qty: 30 TABLET | Refills: 2 | Status: SHIPPED | OUTPATIENT
Start: 2025-02-27

## 2025-03-11 ENCOUNTER — OFFICE VISIT (OUTPATIENT)
Dept: PRIMARY CARE | Facility: CLINIC | Age: 65
End: 2025-03-11
Payer: MEDICARE

## 2025-03-11 VITALS
SYSTOLIC BLOOD PRESSURE: 132 MMHG | DIASTOLIC BLOOD PRESSURE: 83 MMHG | TEMPERATURE: 99.4 F | OXYGEN SATURATION: 96 % | HEIGHT: 68 IN | BODY MASS INDEX: 28.79 KG/M2 | HEART RATE: 81 BPM | WEIGHT: 190 LBS

## 2025-03-11 DIAGNOSIS — J06.9 UPPER RESPIRATORY TRACT INFECTION, UNSPECIFIED TYPE: ICD-10-CM

## 2025-03-11 DIAGNOSIS — H10.33 ACUTE BACTERIAL CONJUNCTIVITIS OF BOTH EYES: Primary | ICD-10-CM

## 2025-03-11 LAB
POC RAPID INFLUENZA A: NEGATIVE
POC RAPID INFLUENZA B: NEGATIVE

## 2025-03-11 PROCEDURE — 1123F ACP DISCUSS/DSCN MKR DOCD: CPT

## 2025-03-11 PROCEDURE — 3008F BODY MASS INDEX DOCD: CPT

## 2025-03-11 PROCEDURE — 99214 OFFICE O/P EST MOD 30 MIN: CPT

## 2025-03-11 PROCEDURE — 87804 INFLUENZA ASSAY W/OPTIC: CPT

## 2025-03-11 PROCEDURE — 1159F MED LIST DOCD IN RCRD: CPT

## 2025-03-11 PROCEDURE — 1036F TOBACCO NON-USER: CPT

## 2025-03-11 RX ORDER — NEOMYCIN SULFATE, POLYMYXIN B SULFATE AND DEXAMETHASONE 3.5; 10000; 1 MG/ML; [USP'U]/ML; MG/ML
1 SUSPENSION/ DROPS OPHTHALMIC 4 TIMES DAILY
Qty: 1.4 ML | Refills: 0 | Status: SHIPPED | OUTPATIENT
Start: 2025-03-11 | End: 2025-03-18

## 2025-03-11 NOTE — PROGRESS NOTES
"Subjective   Alize Castano is a 64 y.o. female who presents for URI (Cough, hoarse voice, st, eyes are red and with drainage, no fever, did have body aches/Sx started last week Wed - OTC no help/Took at home  covid test and was negative).  Upper Respiratory Infection  Patient complains of symptoms of a URI. Symptoms include body aches, congestion, cough described as productive of clear sputum, no  fever, sore throat, and has eye pain with drainage that recently developed.. Onset of symptoms was 3 days ago, and has been gradually worsening since that time. Treatment to date: mucinex, robitussin DM, sudafed, benzonatate, tylenol.    Recently stopped taking sudafed. Her cold symptoms are improving however her eyes have been worse and this is her current complaint as well as the cough. Her eye discharge is green/yellow bilaterally.         ROS negative unless otherwise stated in HPI.     /83   Pulse 81   Temp 37.4 °C (99.4 °F) (Oral)   Ht 1.727 m (5' 8\")   Wt 86.2 kg (190 lb)   SpO2 96%   BMI 28.89 kg/m²    Objective        Physical Exam  Constitutional:       Appearance: Normal appearance.   HENT:      Head: Normocephalic.      Right Ear: Tympanic membrane and ear canal normal.      Left Ear: Tympanic membrane and ear canal normal.      Nose: Rhinorrhea present.      Mouth/Throat:      Mouth: Mucous membranes are moist.      Pharynx: Oropharynx is clear.   Eyes:      General:         Right eye: Discharge present.         Left eye: Discharge present.     Pupils: Pupils are equal, round, and reactive to light.      Comments: Scleral injection bilateral   Cardiovascular:      Rate and Rhythm: Normal rate and regular rhythm.   Pulmonary:      Effort: Pulmonary effort is normal. No respiratory distress.      Breath sounds: Normal breath sounds. No wheezing, rhonchi or rales.   Musculoskeletal:      Cervical back: Neck supple. No rigidity.   Lymphadenopathy:      Cervical: No cervical adenopathy.   Neurological: "      Mental Status: She is alert.         Assessment & Plan  Upper respiratory tract infection, unspecified type  Flu testing negative; continue with cough medications PRN and supportive OTC support for symptoms reportedly improving.   Orders:    POCT Influenza A/B manually resulted    Acute bacterial conjunctivitis of both eyes  Educated on eye drop use. Finish complete treatment for both eyes answered all questions during examination.   Orders:    neomycin-polymyxin-dexAMETHasone (Maxitrol) 3.5mg/mL-10,000 unit/mL-0.1 % ophthalmic suspension; Administer 1 drop into both eyes 4 times a day for 7 days.

## 2025-03-31 DIAGNOSIS — E03.9 HYPOTHYROIDISM, UNSPECIFIED TYPE: ICD-10-CM

## 2025-03-31 RX ORDER — LEVOTHYROXINE SODIUM 100 UG/1
100 TABLET ORAL DAILY
Qty: 90 TABLET | Refills: 3 | Status: SHIPPED | OUTPATIENT
Start: 2025-03-31

## 2025-05-14 NOTE — PROGRESS NOTES
Subjective   Patient ID: Alize Castano is a 64 y.o. female who presents for evaluation for multiple medical conditions       She would like to discontinue Pristiq due to weight gain   She feels she is doing better emotionally   She started taking Mg L-thioneine, exercising and dieting and can not lose weight     She has had a nagging HA for the past 3 weeks     HEALTH:  PAP 2013, 2/18 - and no longer need to repeat   Mammo 10/18, 9/20, 11/21, 11/22, 10/24   BD 9/12, 4/19 T-0.9, 10/24 T-1.3  Colon 2008, 7/15 nl, 6/21 nl, 11/24 few small diverticula, internal hemorrhoids and Q 7   Ca cardiac score 6/12 was ZERO, 9/24 score is 1.   EKG 6/20, 6/21, 2/23, 8/23, 9/24   U/A 1/17, 2/18, 3/19, 6/20, 8/21   Hep C negative 11/21   FLU  9/19, 9/20, 10/21, 11/22, 9/24   TDAP 2008, 5/19  Prevnar 20 recommended but wants to wait 8/23  Pneumovax never   Zostavax 5/15    Shingrix 6/23/2020 and 9/22/2020  SARS-CoV-2- 3/21 x 2 and declines more   Ophth She wears glasses. No glaucoma or MD. She has the beginnings of cataracts that is being observed for now.        Review of Systems  All systems negative except those listed in the HPI      Objective   Visit Vitals  /62 (BP Location: Left arm, Patient Position: Sitting, BP Cuff Size: Large adult)   Pulse 78   Temp 36.1 °C (97 °F) (Temporal)    Body mass index is 29.35 kg/m².     Physical Exam  Vitals reviewed.   Constitutional:       Appearance: Normal appearance.   HENT:      Head: Normocephalic.      Right Ear: Tympanic membrane, ear canal and external ear normal.      Left Ear: Tympanic membrane, ear canal and external ear normal.      Nose: Nose normal.      Mouth/Throat:      Pharynx: Oropharynx is clear.   Eyes:      Conjunctiva/sclera: Conjunctivae normal.   Cardiovascular:      Rate and Rhythm: Normal rate and regular rhythm.      Pulses: Normal pulses.      Heart sounds: Normal heart sounds.   Pulmonary:      Effort: Pulmonary effort is normal.      Breath sounds: Normal  breath sounds.   Abdominal:      General: Bowel sounds are normal.      Palpations: Abdomen is soft.   Musculoskeletal:         General: Normal range of motion.      Cervical back: Normal range of motion and neck supple.   Skin:     General: Skin is warm.   Neurological:      General: No focal deficit present.      Mental Status: She is alert and oriented to person, place, and time.   Psychiatric:         Mood and Affect: Mood normal.         Behavior: Behavior normal.         Thought Content: Thought content normal.         Judgment: Judgment normal.       Assessment/Plan   Problem List Items Addressed This Visit       Chronic constipation    Dyslipidemia, goal LDL below 100    Elevated blood pressure reading - Primary    Generalized anxiety disorder    Hypothyroidism    Insulin resistance syndrome    Migraine headache    HERIBERTO (obstructive sleep apnea)    Vertigo       Follow up completed         She is in a committed relationship, no children.   She denies previous history of tobacco use  She is a retired . She still plays softball in the summer   Her mom is 90 + y/o. Her dad passed away in 2/17 from lymphoma      Anxiety and depression:  She would like to discontinue Pristiq due to weight gain. She feels she is doing better emotionally. Explained I think the Abilify is the medication causing the weight gain. She started taking Mg L-thioneine, exercising and dieting and can not lose weight. Recommend we discontinue Abilify (warned patient this is a mood stabilizer) and if she wants to we can later discuss decreasing Pristiq. If she does not do well coming off the Abilify we can consider adding Topamax to help with mood and HA.  Continue Pristiq 50 mg daily and Abilify 2 mg daily    Discontinue fluoxetine due to feeling inpatient and having vivid dreams   She has increased stressors taking care of her mother. She has 1 sister that helps   She has increased stressors when she thinks about how her others sisters  are not involved with her mothers care.   Her mom is 90 + y/o. Her dad passed away in 2/17 from lymphoma   She retired from her job 8/1/18.      She saw Dr Mcdonald on 8/23/18 for hearing evaluation   Ruled out Menieres disease. She has intermittent tinnitus   Continue vitamin B2 2-3 times a week and Rx given for Antivert.   She saw Dr Ozuna on 9/24/19. She has sensorineural hearing loss of the right ear with unrestricted hearing of left ear.      Her weight in office is 193 with BMI of 29.35, recommend she maintain  I would like to see her BMI as close to 25 as possible   Recommend she look into a plant based/ whole foods diet   She is exercising and monitoring her diet   She is going to discontinue Abilify due to the medication affecting her weight   Discussed GLP- 1 agonists for weight loss. She is not interested at this time   She is getting better at doing meal prep. Discussed meal prep in detail 5/25     HTN- BP stable.   She stopped Triamterene/HCTZ 37.5/25 mg.   She can stay off the diuretic for now.   EKG 9/24 NSR   ECHO 2/23 LV systolic function normal with EF 65 -70%   Stress test 4/23 no evidence for inducible ischemia or previous myocardial scarring, with normal left and right ventricular size and systolic function and preserved EF  Recommend she monitor her BP at home and call with elevated readings   She saw Dr eDlarosa in 4/2023 and only needs to follow prn         I have spent 15 min face to face with this patient discussing their cardiac risk   We discussed the patients cardiovascular risk. If needed, lifestyle modifications recommended including: behavioral therapies of nutrition choices, exercise and eliminate habits that are contributing to their cardiac risk. We agreed to a plan to decrease his cardiovascular risks. Discussed ASA. Reviewed Guidelines and approved recommendations made to patient.   The patient's 10 yr CV risk was estimated at 6.8 % IO 5/25      Elevated Lipids:  and HDL 71 on  labs in 9/24.   Explained goal for LDL to be less than 100 and ideally less than 70   Continue ezetimibe 10 mg daily   She failed Lipitor due to muscle pain.    Ca cardiac score 9/24 score was 1.   Recommend she look into a plant based/ whole foods diet       Hypothyroid: TSH 0.89 on labs in 9/2024   Continue BRANDED Synthroid 100 mcg daily.      Hypokalemia: levels stable on labs in 9/24.    Continue potassium chloride 10 mEq QOD      Migrainous vertigo: she has had a nagging HA for the past 3 weeks   Continue Nasonex daily and Mg 250 mg 2 tablets at bedtime   Continue acupuncture and massage therapy for migraines   She has a Medrol Dose Jovon on hand to use if she can not break the migraine   She has Zofran prn nausea and Meclizine prn vertigo.     She is no longer taking sumatriptan    She could not tolerate riboflavin.   MRI brain 9/18 nodular lesion along the superior margin of the pituitary gland extending cranially into the suprasellar region along the right ventral margin measuring approx 9 mm x 5 mm in transaxial dimensions, compared this to MRI done 5/21/2009 and appeared to be unchanged.   She saw Dr Matthews in 8/2024      Forgetfulness and feeling distracted :   I feel this is medication related vs age related   Her stress levels contribute to this as well  MRI of the brain on 6/2021 stable compared to previous studies   Nodule is stable near pituitary area and not affecting anything      GERD:    Continue omeprazole 40 mg daily. She needs to be on this for life   EGD 8/2016 was normal   She saw Dr Dixon in 5/2021               IBS- C: She has been on juice plus and she added prunes and her BM have been regular.           Recommend she do the juice plus and 3-5 prunes daily.   Continue fiber through food daily   She did not start the FODMAP diet, she found it to be too restrictive.  Colon 11/24 few small diverticula, internal hemorrhoids and Q 7    She saw Dr Dixon in 5/2021      Right shoulder and neck  pain:  She completed PT with good results   PT helped with all the HA and vertigo issues as well   She saw ortho, no recommendations for sx.     Right carpal tunnel:  Xrays 3/21 degenerative changes, most severely at the carpometacarpal joint   She would like to hold off of surgery for now    Continue wearing the wrist brace at night    She saw Dr Akbar in 3/2021      HERIBERTO Dx in 9/17 :  (diagnosed by [facility] at  7/2017 but report is not available for review)    She is wearing her CPAP nightly   She saw SHERI Menchaca in sleep medicine 12/24 May want to consider [facility] in the future      Eczema and itchy rash during winter months  Continue mometasone cream prn and this seems to help   She sees derm yearly      Vitamin D deficiency- Levels 80 on labs in 9/24  She feels more comfortable when her levels are above 50  Continue OTC Vitamin D3 5000 UT daily      PAP normal 2018 and this was her last one   She had an ovarian cyst and saw Dr Carr   Pelvic U/S 9/2013 right cyst is resolved and no other abnormalities      Mammo normal 10/24   Breast exam normal except scars present from breast reduction surgery in the past 9/24     BD 10/24 T-1.3. Continue 1 ES Tums daily and OTC Vitamin D3 5000 UT daily and eat 2 servings of calcium enriched foods daily.   Discussed importance of weight bearing exercises        Colonoscopy 11/24 few small diverticula, internal hemorrhoids and Q 7      Ophth:   She wears glasses. No glaucoma or MD. She has the beginnings of cataracts that is being observed for now.      She has a living will and MPOA. Recommend she bring a copy of her Advanced Directives in office to have scanned in her chart 8/2023     Hep C negative 11/21   FLU  9/19, 9/20, 10/21, 11/22, 9/24   TDAP 2008, 5/19  Prevnar 20 recommended but wants to wait 8/23  Pneumovax never   Zostavax 5/15    Shingrix 6/23/2020 and 9/22/2020  SARS-CoV-2- 3/21 x 2 and declines more      Some elements in the chart were copied from   Gonzalo's last office visit with patient. Notes have been updated where appropriate, and reflect my current medical decision making from today.      Keep scheduled appt in 9/25 for MCR or sooner if needed   (MCR due 9/2025, last mcr 9/10/24 Dr Colón)      Scribe Attestation  By signing my name below, I, Teri Galvan, Scribe attest that this documentation has been prepared under the direction and in the presence of Jennifer Sánchez MD.

## 2025-05-15 ENCOUNTER — APPOINTMENT (OUTPATIENT)
Dept: PRIMARY CARE | Facility: CLINIC | Age: 65
End: 2025-05-15
Payer: MEDICARE

## 2025-05-15 VITALS
HEIGHT: 68 IN | BODY MASS INDEX: 29.25 KG/M2 | SYSTOLIC BLOOD PRESSURE: 122 MMHG | HEART RATE: 78 BPM | DIASTOLIC BLOOD PRESSURE: 62 MMHG | OXYGEN SATURATION: 97 % | TEMPERATURE: 97 F | WEIGHT: 193 LBS

## 2025-05-15 DIAGNOSIS — R73.01 IMPAIRED FASTING GLUCOSE: ICD-10-CM

## 2025-05-15 DIAGNOSIS — K59.09 CHRONIC CONSTIPATION: ICD-10-CM

## 2025-05-15 DIAGNOSIS — E88.810 INSULIN RESISTANCE SYNDROME: ICD-10-CM

## 2025-05-15 DIAGNOSIS — R03.0 ELEVATED BLOOD PRESSURE READING: Primary | ICD-10-CM

## 2025-05-15 DIAGNOSIS — G47.33 OSA (OBSTRUCTIVE SLEEP APNEA): ICD-10-CM

## 2025-05-15 DIAGNOSIS — F41.1 GENERALIZED ANXIETY DISORDER: ICD-10-CM

## 2025-05-15 DIAGNOSIS — E78.5 DYSLIPIDEMIA, GOAL LDL BELOW 100: ICD-10-CM

## 2025-05-15 DIAGNOSIS — E55.9 VITAMIN D DEFICIENCY: ICD-10-CM

## 2025-05-15 DIAGNOSIS — E03.9 HYPOTHYROIDISM, UNSPECIFIED TYPE: ICD-10-CM

## 2025-05-15 DIAGNOSIS — R42 VERTIGO: ICD-10-CM

## 2025-05-15 DIAGNOSIS — G43.119 INTRACTABLE MIGRAINE WITH AURA WITHOUT STATUS MIGRAINOSUS: ICD-10-CM

## 2025-05-15 PROCEDURE — G2211 COMPLEX E/M VISIT ADD ON: HCPCS | Performed by: INTERNAL MEDICINE

## 2025-05-15 PROCEDURE — 1159F MED LIST DOCD IN RCRD: CPT | Performed by: INTERNAL MEDICINE

## 2025-05-15 PROCEDURE — 1036F TOBACCO NON-USER: CPT | Performed by: INTERNAL MEDICINE

## 2025-05-15 PROCEDURE — 99214 OFFICE O/P EST MOD 30 MIN: CPT | Performed by: INTERNAL MEDICINE

## 2025-05-15 PROCEDURE — 3008F BODY MASS INDEX DOCD: CPT | Performed by: INTERNAL MEDICINE

## 2025-05-15 PROCEDURE — 1160F RVW MEDS BY RX/DR IN RCRD: CPT | Performed by: INTERNAL MEDICINE

## 2025-05-28 DIAGNOSIS — G43.709 CHRONIC MIGRAINE WITHOUT AURA WITHOUT STATUS MIGRAINOSUS, NOT INTRACTABLE: Primary | ICD-10-CM

## 2025-06-02 ENCOUNTER — OFFICE VISIT (OUTPATIENT)
Dept: NEUROLOGY | Facility: CLINIC | Age: 65
End: 2025-06-02
Payer: MEDICARE

## 2025-06-02 VITALS
WEIGHT: 192 LBS | HEART RATE: 72 BPM | DIASTOLIC BLOOD PRESSURE: 86 MMHG | BODY MASS INDEX: 29.1 KG/M2 | SYSTOLIC BLOOD PRESSURE: 142 MMHG | HEIGHT: 68 IN

## 2025-06-02 DIAGNOSIS — G43.109 MIGRAINOUS VERTIGO: ICD-10-CM

## 2025-06-02 DIAGNOSIS — G43.009 MIGRAINE WITHOUT AURA AND WITHOUT STATUS MIGRAINOSUS, NOT INTRACTABLE: Primary | ICD-10-CM

## 2025-06-02 PROCEDURE — 1160F RVW MEDS BY RX/DR IN RCRD: CPT

## 2025-06-02 PROCEDURE — 1159F MED LIST DOCD IN RCRD: CPT

## 2025-06-02 PROCEDURE — 1036F TOBACCO NON-USER: CPT

## 2025-06-02 PROCEDURE — 99215 OFFICE O/P EST HI 40 MIN: CPT

## 2025-06-02 PROCEDURE — 3008F BODY MASS INDEX DOCD: CPT

## 2025-06-02 RX ORDER — SUMATRIPTAN SUCCINATE 100 MG/1
50 TABLET ORAL ONCE AS NEEDED
COMMUNITY
End: 2025-06-02 | Stop reason: SDUPTHER

## 2025-06-02 RX ORDER — VITAMIN B COMPLEX
1 CAPSULE ORAL DAILY
COMMUNITY

## 2025-06-02 RX ORDER — SUMATRIPTAN SUCCINATE 100 MG/1
50 TABLET ORAL ONCE AS NEEDED
Qty: 9 TABLET | Refills: 11 | Status: SHIPPED | OUTPATIENT
Start: 2025-06-02

## 2025-06-02 ASSESSMENT — PATIENT HEALTH QUESTIONNAIRE - PHQ9
1. LITTLE INTEREST OR PLEASURE IN DOING THINGS: NOT AT ALL
2. FEELING DOWN, DEPRESSED OR HOPELESS: MORE THAN HALF THE DAYS
SUM OF ALL RESPONSES TO PHQ9 QUESTIONS 1 & 2: 2

## 2025-06-02 ASSESSMENT — VISUAL ACUITY: METHOD_CF: 1

## 2025-06-02 NOTE — PATIENT INSTRUCTIONS
Thank you for your visit today.    You were seen today for:  migraine, vertigo. I believe you have vestibular migraine with some incidences of migraine without headache.     Plan of Care: Continue with Sumatriptan 50mg (can take a second dose in 1 hour if does not help). Please take your blood pressure with incidences of vertigo and record reading.     Will follow up in 3 months or sooner if needed.     *If you have any questions or concerns regarding the plan of care for today, please feel free to contact the office at [phone] or message via [employer].

## 2025-06-02 NOTE — PROGRESS NOTES
Subjective     Chief Complaint: Headache and dizziness    Alize Castano is a 64 y.o. year old female who presents with chief complaint of headaches. Patient has been treated for migraine starting in 2018 per chart review. She has tried Imitrex in the past. Her last migraine was last week which was probably triggered by stress. She did not take her triptan at this time. On Monday, she was dizzy and took her Meclizine, on Tuesday she tried the triptan which helped. She is taking her Meclizine once or twice every couple months. She had followed with Neurologist Dr. Alvarado who had her get some auditory testing for vertigo which concluded that she does not appear to have Men...s disease (no hearing loss) at this time. She underwent a brain MRI several years ago through Dr. Ridley that she reports did not show any structural basis for vertigo. She has a past medical history of HTN, HLD, vertigo, hypothyroidism, GERD, KRISH,     HPI    Alize started getting headaches at age 57.  Generally, headaches last about several days in duration. Patient has 1/30 headache days per month after not having any migraine for 3-4 years. The headaches are usually pressure-like and are located frontal/posterior, generally symmetric. The patient rates her most severe headaches a 2 in intensity. Associated nausea, photophobia, and vestibular symptoms. Headaches are not worsened with exertion. Triggers include barometric pressure  and stress.    Alize experiences headache aura. Aura occurs at onset of headache.     Work attendance or other daily activities are affected by the headaches.    Current Acute Headache Treatment Sumatriptan, Meclizine   Current Preventative Headache Treatment None   Previous Acute Headache Treatment  None   Previous Preventative Headache Treatment None       ROS: As per HPI, otherwise all other systems have been reviewed are negative for complaint.     Review of Systems    Medical History[1]  Surgical  History[2]  Family History[3]  Social History     Tobacco Use    Smoking status: Never    Smokeless tobacco: Never   Substance Use Topics    Alcohol use: Yes     Comment: occasional        Objective   There were no vitals taken for this visit.    Neurological Exam  Mental Status  Awake, alert and oriented to person, place and time. Recent and remote memory are intact. Speech is normal. Language is fluent with no aphasia. Attention and concentration are normal.    Cranial Nerves  CN II: Right visual acuity: Counts fingers. Left visual acuity: Counts fingers. Right normal visual field. Left normal visual field.  CN III, IV, VI: Extraocular movements intact bilaterally. No nystagmus.   Right pupil: 3 mm. Round. Reactive to light. Reactive to accommodation.   Left pupil: 3 mm. Round. Reactive to light. Reactive to accommodation.  CN V:  Right: Facial sensation is normal.  Left: Facial sensation is normal on the left.  CN VII:  Right: There is no facial weakness.  Left: There is no facial weakness.  CN VIII:  Right: Hearing is normal.  Left: Hearing is normal.  CN IX, X:  Right: Palate is normal.  Left: Palate is normal.  CN XI:  Right: Trapezius strength is normal.  Left: Trapezius strength is normal.  CN XII: Tongue midline without atrophy or fasciculations.    Motor  Normal muscle bulk throughout. Normal muscle tone. Strength is 5/5 throughout all four extremities.    Sensory  Light touch is normal in upper and lower extremities.     Reflexes  Deep tendon reflexes are 2+ and symmetric in all four extremities.    Coordination  Right: Finger-to-nose normal.Left: Finger-to-nose normal.    Gait  Casual gait is normal including stance, stride, and arm swing.    Physical Exam  HENT:      Right Ear: Hearing normal.      Left Ear: Hearing normal.   Eyes:      Extraocular Movements: EOM normal. No nystagmus.   Neurological:      Motor: Motor strength is normal.     Deep Tendon Reflexes: Reflexes are normal and symmetric.    Psychiatric:         Speech: Speech normal.         Results     MR BRAIN W AND WO IV CONTRAST  Status: Final result     Signed by    Signed Time Phone Pager   Ranjith Grijalva MD 6/09/2021 17:27  [id number]     Exam Information    Status Exam Begun Exam Ended   Final 6/09/2021 14:12 6/09/2021 14:53     Study Result    Narrative & Impression   MRN: [id number]  Patient Name: TIMO PA     STUDY:  MRI BRAIN W/WO CONTRAST;  6/9/2021 2:53 pm     INDICATION:  confusion and disorientation, left hand weakness, left-sided headache.     COMPARISON:  09/21/2018, 05/21/2009     ACCESSION NUMBER(S):  [id number]     ORDERING CLINICIAN:  SRINIVAS ALVARADO     TECHNIQUE:  Axial T2, FLAIR, DWI, gradient echo T2 and sagittal and coronal T1  weighted images of brain were acquired. Post contrast T1 weighted  images were acquired after administration of 16 mL MultiHance  gadolinium based intravenous contrast.     FINDINGS:  CSF Spaces: The ventricles, sulci and basal cisterns are within  normal limits.     Parenchyma: There is no diffusion restriction abnormality to suggest  acute infarct.  Few scattered foci of increased signal in the white  matter regions of both cerebral hemispheres, shows minimal interval  change from prior. Again note is made of the 8 x 5 mm suprasellar  round mass abutting the right side of the infundibula, shows minimal  interval change from 2009, could represent a trapped case cleft cyst.     Paranasal Sinuses and Mastoids: Visualized paranasal sinuses and  mastoid air cells are unremarkable.     IMPRESSION:  No evidence of acute infarct, intracranial mass effect or midline  shift.  No abnormal enhancing lesions in the brain.     Few small scattered foci of increased signal in the white matter  regions of both cerebral hemispheres, likely related to mild  microangiopathic white matter changes, there is minimal interval  change from prior.     Recipient List for Orders      No recipients found.              Chart  Review Routing History    No routing history on file.         External Results Report    Open External Results Report    Encounter    View Encounter      No questionnaires available.                  No questionnaires available.                    Study Details    Open Study Details       MR BRAIN W AND WO IV CONTRAST: Patient Communication     Add Comments   Add Notifications      Department    Name Address     [facility] [place] [id number]                                               Assessment/Plan   Given the frequency and description of headaches, Alize likely has vestibular migraine.   Per our discussion, we will continue the triptan for acute headache treatment.     Discussed taking OTC supplements: B12, Magnesium, CoQ10 as preventatives. Discussed avoiding triggers that worsen migraine (specific food, lack of sleep, stress, lights, etc.). Keep a migraine diary with frequency, severity, and duration, include other symptoms. Discussed taking abortive medicine as they are getting a migraine and taking preventative medicine daily (if indicated). Discussed buying a headache cap off of [employer] or from drug store to be used during acute attacks. Avoid taking OTC’s such as Tylenol and Ibuprofen daily, as these can cause rebound headache if stopped. Discussed if abortive therapy is not effective, or? getting > 15 migraines per month-contact neurology for an appointment. The goal of care is to decrease the number and severity of your migraine, medication will help, but does not completely cure you of migraine.?      We discussed her taking her blood pressure when she is having a headache and/or vertigo.         [1]   Past Medical History:  Diagnosis Date    Acute pharyngitis, unspecified 04/23/2015    Acute viral pharyngitis    Acute tonsillitis, unspecified 10/01/2019    Tonsillitis with exudate    Acute upper respiratory infection, unspecified 11/27/2019    Acute upper respiratory infection    Anxiety 2010     Chronic constipation 2014?    Chronic diarrhea 2022    Contact with and (suspected) exposure to other viral communicable diseases 12/23/2019    Exposure to influenza    Cough, unspecified 04/23/2015    Cough    Cramp and spasm 03/11/2016    Calf cramp    Disorientation, unspecified 05/17/2021    Confusion and disorientation    Diverticulosis 2014    Diverticulosis of intestine, part unspecified, without perforation or abscess without bleeding     Diverticulosis    GERD (gastroesophageal reflux disease) 1978    Other abnormal and inconclusive findings on diagnostic imaging of breast 11/18/2015    Abnormal mammogram    Other lack of coordination 08/30/2018    Decreased coordination    Pain in arm, unspecified 01/17/2014    Arm pain    Personal history of diseases of the skin and subcutaneous tissue 12/02/2014    History of eczema    Personal history of other diseases of the female genital tract 06/20/2013    History of dysfunctional uterine bleeding    Personal history of other diseases of the female genital tract 06/20/2013    History of ovarian cyst    Personal history of other diseases of the respiratory system     History of sore throat    Personal history of other diseases of the respiratory system 12/14/2018    History of acute bronchitis    Personal history of other specified conditions 08/30/2018    History of vertigo    Personal history of other specified conditions 09/08/2014    History of diarrhea    Personal history of urinary (tract) infections 11/27/2013    History of acute cystitis    Sleep apnea, unspecified 04/12/2018    Sleep apnea in adult    Strain of muscle, fascia and tendon of lower back, initial encounter 03/09/2016    Strain of lumbar region, initial encounter    Strain of muscle, fascia and tendon of lower back, initial encounter 06/15/2016    Strain of lumbar region, initial encounter    Ulcerative colitis 9/22/24    Vitamin deficiency, unspecified 05/13/2015    Vitamin deficiency   [2]    Past Surgical History:  Procedure Laterality Date    BREAST SURGERY      Breast Surgery Reduction Procedure    CHOLECYSTECTOMY  2016    Cholecystectomy    COLONOSCOPY  2015    Complete Colonoscopy    ESOPHAGOGASTRODUODENOSCOPY  2016    Diagnostic Esophagogastroduodenoscopy    OTHER SURGICAL HISTORY  2013    Wedge Resection Of Lung    REDUCTION MAMMAPLASTY  ?   [3]   Family History  Problem Relation Name Age of Onset    Other (cardiac stent) Mother          2 cardiac stents    Other (rectal bleeding) Mother      Other (recurrent utis) Mother      Heart attack Father Yovani     Alcohol abuse Father Yovani     Dementia Father Yovani     Heart disease Father Yovani     Transient ischemic attack Father Yovani     Lymphoma Father Yovani          at 81    Pancreatic cancer Father Yovani     Irritable bowel syndrome Sister      Other (stomach disorder) Sister      Irritable bowel syndrome Sister      Irritable bowel syndrome Sister      Irritable bowel syndrome Sister      Heart attack Mother's Sister      Heart attack Mother's Brother      Heart attack Maternal Grandmother      Heart attack Maternal Grandfather

## 2025-07-15 ENCOUNTER — APPOINTMENT (OUTPATIENT)
Dept: NEUROLOGY | Facility: CLINIC | Age: 65
End: 2025-07-15
Payer: MEDICARE

## 2025-07-24 ENCOUNTER — APPOINTMENT (OUTPATIENT)
Dept: NEUROLOGY | Facility: CLINIC | Age: 65
End: 2025-07-24
Payer: MEDICARE

## 2025-07-29 ENCOUNTER — TELEPHONE (OUTPATIENT)
Dept: PRIMARY CARE | Facility: CLINIC | Age: 65
End: 2025-07-29
Payer: MEDICARE

## 2025-07-29 NOTE — TELEPHONE ENCOUNTER
Patient would like to only see Dr. Sánchez for trouble sleeping and GERD. Patient was offered several options with NP, patient declined. Please add to cancellation list.

## 2025-07-30 ENCOUNTER — OFFICE VISIT (OUTPATIENT)
Dept: PRIMARY CARE | Facility: CLINIC | Age: 65
End: 2025-07-30
Payer: MEDICARE

## 2025-07-30 VITALS
OXYGEN SATURATION: 97 % | HEART RATE: 85 BPM | BODY MASS INDEX: 26.98 KG/M2 | WEIGHT: 178 LBS | DIASTOLIC BLOOD PRESSURE: 60 MMHG | SYSTOLIC BLOOD PRESSURE: 112 MMHG | HEIGHT: 68 IN | RESPIRATION RATE: 16 BRPM

## 2025-07-30 DIAGNOSIS — G43.119 INTRACTABLE MIGRAINE WITH AURA WITHOUT STATUS MIGRAINOSUS: ICD-10-CM

## 2025-07-30 DIAGNOSIS — F41.1 GENERALIZED ANXIETY DISORDER: ICD-10-CM

## 2025-07-30 DIAGNOSIS — F41.1 GENERALIZED ANXIETY DISORDER: Primary | ICD-10-CM

## 2025-07-30 DIAGNOSIS — K59.09 CHRONIC CONSTIPATION: ICD-10-CM

## 2025-07-30 DIAGNOSIS — F43.0 ACUTE REACTION TO SITUATIONAL STRESS: Primary | ICD-10-CM

## 2025-07-30 DIAGNOSIS — F43.0 ACUTE REACTION TO SITUATIONAL STRESS: ICD-10-CM

## 2025-07-30 DIAGNOSIS — K21.00 GASTROESOPHAGEAL REFLUX DISEASE WITH ESOPHAGITIS WITHOUT HEMORRHAGE: ICD-10-CM

## 2025-07-30 PROBLEM — R03.0 ELEVATED BLOOD PRESSURE READING: Status: RESOLVED | Noted: 2024-09-12 | Resolved: 2025-07-30

## 2025-07-30 PROCEDURE — 3008F BODY MASS INDEX DOCD: CPT | Performed by: INTERNAL MEDICINE

## 2025-07-30 PROCEDURE — 1159F MED LIST DOCD IN RCRD: CPT | Performed by: INTERNAL MEDICINE

## 2025-07-30 PROCEDURE — 1160F RVW MEDS BY RX/DR IN RCRD: CPT | Performed by: INTERNAL MEDICINE

## 2025-07-30 PROCEDURE — 99214 OFFICE O/P EST MOD 30 MIN: CPT | Performed by: INTERNAL MEDICINE

## 2025-07-30 PROCEDURE — 1036F TOBACCO NON-USER: CPT | Performed by: INTERNAL MEDICINE

## 2025-07-30 PROCEDURE — G2211 COMPLEX E/M VISIT ADD ON: HCPCS | Performed by: INTERNAL MEDICINE

## 2025-07-30 RX ORDER — HYDROXYZINE PAMOATE 25 MG/1
25 CAPSULE ORAL 3 TIMES DAILY PRN
Qty: 30 CAPSULE | Refills: 0 | Status: SHIPPED | OUTPATIENT
Start: 2025-07-30 | End: 2025-07-30

## 2025-07-30 RX ORDER — HYDROXYZINE PAMOATE 25 MG/1
25 CAPSULE ORAL 3 TIMES DAILY PRN
Qty: 90 CAPSULE | Refills: 3 | Status: SHIPPED | OUTPATIENT
Start: 2025-07-30 | End: 2026-01-26

## 2025-07-30 ASSESSMENT — PATIENT HEALTH QUESTIONNAIRE - PHQ9
SUM OF ALL RESPONSES TO PHQ9 QUESTIONS 1 AND 2: 2
2. FEELING DOWN, DEPRESSED OR HOPELESS: SEVERAL DAYS
1. LITTLE INTEREST OR PLEASURE IN DOING THINGS: SEVERAL DAYS

## 2025-07-30 NOTE — PROGRESS NOTES
Subjective   Patient ID: Alize Castano is a 65 y.o. female who presents for evaluation for increased anxiety       She was doing well up until 7/4/25.   She woke up with a knot in her stomach that was intermittent   She admits there was a huge family drama situation that was going on at this time  Her sister had an issue with her and went off on her and the patient has had GI issues since   She does not want to restart Abilify   The past few days she has had issues with diarrhea and had issues with fecal incontinence   She has not gotten angry, she has been very calm.   She would like to talk with Layne Doherty in our office again     She has lost her appetite. She continues to ride her bike daily   Sometimes she eats just because she needs something           HEALTH:  PAP 2013, 2/18 - and no longer need to repeat   Mammo 10/18, 9/20, 11/21, 11/22, 10/24   BD 9/12, 4/19 T-0.9, 10/24 T-1.3  Colon 2008, 7/15 nl, 6/21 nl, 11/24 few small diverticula, internal hemorrhoids and Q 7   Ca cardiac score 6/12 was ZERO, 9/24 score is 1.   EKG 6/20, 6/21, 2/23, 8/23, 9/24   U/A 1/17, 2/18, 3/19, 6/20, 8/21   Hep C negative 11/21   FLU  9/19, 9/20, 10/21, 11/22, 9/24   TDAP 2008, 5/19  Prevnar 20 recommended but wants to wait 8/23  Pneumovax never   Zostavax 5/15    Shingrix 6/23/2020 and 9/22/2020  SARS-CoV-2- 3/21 x 2 and declines more   Ophth She wears glasses. No glaucoma or MD. She has the beginnings of cataracts that is being observed for now.        Review of Systems  All systems negative except those listed in the HPI      Objective   Visit Vitals  /60 (BP Location: Right arm, Patient Position: Sitting, BP Cuff Size: Adult)   Pulse 85   Resp 16    Body mass index is 27.06 kg/m².      Physical Exam  Vitals reviewed.   Constitutional:       Appearance: Normal appearance.   HENT:      Head: Normocephalic.      Right Ear: Tympanic membrane, ear canal and external ear normal.      Left Ear: Tympanic membrane, ear  canal and external ear normal.      Nose: Nose normal.      Mouth/Throat:      Pharynx: Oropharynx is clear.     Eyes:      Conjunctiva/sclera: Conjunctivae normal.       Cardiovascular:      Rate and Rhythm: Normal rate and regular rhythm.      Pulses: Normal pulses.      Heart sounds: Normal heart sounds.   Pulmonary:      Effort: Pulmonary effort is normal.      Breath sounds: Normal breath sounds.   Abdominal:      Palpations: Abdomen is soft.     Musculoskeletal:         General: Normal range of motion.      Cervical back: Normal range of motion and neck supple.     Skin:     General: Skin is warm.     Neurological:      General: No focal deficit present.      Mental Status: She is alert and oriented to person, place, and time.     Psychiatric:         Mood and Affect: Mood normal.         Behavior: Behavior normal.         Thought Content: Thought content normal.         Judgment: Judgment normal.       Assessment/Plan   Problem List Items Addressed This Visit       Chronic constipation    Generalized anxiety disorder - Primary    Relevant Medications    hydrOXYzine pamoate (VistariL) 25 mg capsule    GERD (gastroesophageal reflux disease)    Migraine headache     Other Visit Diagnoses         Acute reaction to situational stress        Relevant Medications    hydrOXYzine pamoate (VistariL) 25 mg capsule           Follow up completed      She is in a committed relationship, no children.   She denies previous history of tobacco use  She is a retired . She still plays softball in the summer   Her mom is 90 + y/o. Her dad passed in 2/17 from lymphoma     I have discussed the collaborative care model for this patient's behavioral health care. Written detailed information and identifying the members of this care team was provided to patient. They give permission for the Behavioral Health Manager (BHM) and psychiatric consultant to be included in their care with my continued primary management. Patient made  aware that services provided as part of the Collaborative Care Model are subject to cost sharing.  Orders placed for Layne Doherty on the Collaborative Care Team 7/25      Anxiety and depression: She was doing well up until 7/4/25. She woke up with a knot in her stomach that was intermittent. She admits there was a huge family drama situation that was going on at this time. Her sister had an issue with her and went off on her and the patient has had GI issues since. The past few days she has had issues with diarrhea and had issues with fecal incontinence. She has not gotten angry, she has been very calm. She does not want to restart Abilify. She would like to talk with Layne Doherty in our office again - orders placed for Layne Doherty on our Collaborative Care Team 7/25. Prescription sent in for Vistaril 25 mg to take up to TID/ prn anxiety, possible side effects discussed with medication. Discussed box breathing with patient in detail 7/25.    Continue Pristiq 50 mg daily    Discontinue fluoxetine due to feeling inpatient and having vivid dreams   Discontinued Abilify due to weight gain   She has increased stressors taking care of her mother. She has 1 sister that helps   She has increased stressors when she thinks about how her others sisters are not involved with her mothers care.   Her mom is 90 + y/o. Her dad passed away in 2/17 from lymphoma   She retired from her job 8/1/18.      She saw Dr Mcdonald on 8/23/18 for hearing evaluation   Ruled out Menieres disease. She has intermittent tinnitus   Continue vitamin B2 2-3 times a week and Rx given for Antivert.   She saw Dr Ozuna on 9/24/19. She has sensorineural hearing loss of the right ear with unrestricted hearing of left ear.      Her weight in office is 178 with BMI of 27.06, recommend she maintain  I would like to see her BMI as close to 25 as possible   Recommend she look into a plant based/ whole foods diet   She has lost her appetite. She continues  to ride her bike daily   Sometimes she eats just because she needs something       HTN- BP stable.   She stopped Triamterene/HCTZ 37.5/25 mg.   She can stay off the diuretic for now.   EKG 9/24 NSR   ECHO 2/23 LV systolic function normal with EF 65 -70%   Stress test 4/23 no evidence for inducible ischemia or previous myocardial scarring, with normal left and right ventricular size and systolic function and preserved EF  Recommend she monitor her BP at home and call with elevated readings   She saw Dr Delarosa in 4/2023 and only needs to follow prn         I have spent 15 min face to face with this patient discussing their cardiac risk   We discussed the patients cardiovascular risk. If needed, lifestyle modifications recommended including: behavioral therapies of nutrition choices, exercise and eliminate habits that are contributing to their cardiac risk. We agreed to a plan to decrease his cardiovascular risks. Discussed ASA. Reviewed Guidelines and approved recommendations made to patient.   The patient's 10 yr CV risk was estimated at 8.7 % IO 7/25      Elevated Lipids: Labs ordered and we will adjust if indicated  5/25  Explained goal for LDL to be less than 100 and ideally less than 70   Continue ezetimibe 10 mg daily   She failed Lipitor due to muscle pain.    Ca cardiac score 9/24 score was 1.   Recommend she look into a plant based/ whole foods diet       Hypothyroid: Labs ordered and we will adjust if indicated  5/25  Continue BRANDED Synthroid 100 mcg daily.      Hypokalemia: Labs ordered and we will adjust if indicated  5/25  Continue potassium chloride 10 mEq QOD      Migrainous vertigo: Her migraines are controlled at the time being   Continue Nasonex daily and Mg 250 mg 2 tablets at bedtime   Continue acupuncture and massage therapy for migraines   She has a Medrol Dose Jovon on hand to use if she can not break the migraine   She has Zofran prn nausea and Meclizine prn vertigo.     She is no longer taking  sumatriptan    She could not tolerate riboflavin.   MRI brain 9/18 nodular lesion along the superior margin of the pituitary gland extending cranially into the suprasellar region along the right ventral margin measuring approx 9 mm x 5 mm in transaxial dimensions, compared this to MRI done 5/21/2009 and appeared to be unchanged.   She saw Dr Matthews in 8/2024      Forgetfulness and feeling distracted :   I feel this is medication related vs age related   Her stress levels contribute to this as well  MRI of the brain on 6/2021 stable compared to previous studies   Nodule is stable near pituitary area and not affecting anything      GERD:   Continue omeprazole 40 mg daily. She needs to be on this for life   EGD 8/2016 was normal   She saw Dr Dixon in 5/2021               IBS- C: She has been on juice plus and she added prunes and her BM have been regular.           Recommend she do the juice plus and 3-5 prunes daily.   Continue fiber through food daily   She did not start the FODMAP diet, she found it to be too restrictive.  Colon 11/24 few small diverticula, internal hemorrhoids and Q 7    She saw Dr Dixon in 5/2021      Right shoulder and neck pain:  She completed PT with good results   PT helped with all the HA and vertigo issues as well   She saw ortho, no recommendations for sx.     Right carpal tunnel:  Xrays 3/21 degenerative changes, most severely at the carpometacarpal joint   She would like to hold off of surgery for now    Continue wearing the wrist brace at night    She saw Dr Akbar in 3/2021      HERIBERTO Dx in 9/17 :  (diagnosed by [facility] at  7/2017 but report is not available for review)    She is wearing her CPAP nightly   She saw SHERI Menchaca in sleep medicine 12/24 May want to consider [facility] in the future      Eczema and itchy rash during winter months  Continue mometasone cream prn and this seems to help   She sees derm yearly      Vitamin D deficiency- Levels 80 on labs in 9/24  She feels more  comfortable when her levels are above 50  Continue OTC Vitamin D3 5000 UT daily      PAP normal 2018 and this was her last one   She had an ovarian cyst and saw Dr Carr   Pelvic U/S 9/2013 right cyst is resolved and no other abnormalities      Mammo normal 10/24   Breast exam normal except scars present from breast reduction surgery in the past 9/24     BD 10/24 T-1.3. Continue 1 ES Tums daily and OTC Vitamin D3 5000 UT daily and eat 2 servings of calcium enriched foods daily.   Discussed importance of weight bearing exercises        Colonoscopy 11/24 few small diverticula, internal hemorrhoids and Q 7      Ophth:   She wears glasses. No glaucoma or MD. She has the beginnings of cataracts that is being observed for now.      She has a living will and MPOA. Recommend she bring a copy of her Advanced Directives in office to have scanned in her chart 8/2023     Hep C negative 11/21   FLU  9/19, 9/20, 10/21, 11/22, 9/24   TDAP 2008, 5/19  Prevnar 20 recommended but wants to wait 8/23  Pneumovax never   Zostavax 5/15    Shingrix 6/23/2020 and 9/22/2020  SARS-CoV-2- 3/21 x 2 and declines more      Some elements in the chart were copied from Dr. Sánchez's last office visit with patient. Notes have been updated where appropriate, and reflect my current medical decision making from today.      Keep scheduled appt in 9/25 for MCR or sooner if needed   (MCR due 9/2025, last mcr 9/10/24 Dr Colón)      I, Teri Galvan am scribing for, and in the presence of Dr. Jennifer Sánchez MD.  I, Dr. Jennifer Sánchez MD, personally performed the services described in the documentation as scribed by Teri Galvan in my presence, and confirm it is both accurate and complete.

## 2025-08-04 DIAGNOSIS — E78.00 PURE HYPERCHOLESTEROLEMIA: ICD-10-CM

## 2025-08-04 RX ORDER — EZETIMIBE 10 MG/1
10 TABLET ORAL EVERY EVENING
Qty: 90 TABLET | Refills: 3 | Status: SHIPPED | OUTPATIENT
Start: 2025-08-04 | End: 2026-08-04

## 2025-08-05 LAB
25(OH)D3+25(OH)D2 SERPL-MCNC: 70 NG/ML (ref 30–100)
ALBUMIN SERPL-MCNC: 4.3 G/DL (ref 3.6–5.1)
ALP SERPL-CCNC: 74 U/L (ref 37–153)
ALT SERPL-CCNC: 12 U/L (ref 6–29)
ANION GAP SERPL CALCULATED.4IONS-SCNC: 11 MMOL/L (CALC) (ref 7–17)
AST SERPL-CCNC: 13 U/L (ref 10–35)
BILIRUB SERPL-MCNC: 0.8 MG/DL (ref 0.2–1.2)
BUN SERPL-MCNC: 12 MG/DL (ref 7–25)
CALCIUM SERPL-MCNC: 9.4 MG/DL (ref 8.6–10.4)
CHLORIDE SERPL-SCNC: 106 MMOL/L (ref 98–110)
CHOLEST SERPL-MCNC: 195 MG/DL
CHOLEST/HDLC SERPL: 3 (CALC)
CO2 SERPL-SCNC: 24 MMOL/L (ref 20–32)
CREAT SERPL-MCNC: 0.8 MG/DL (ref 0.5–1.05)
EGFRCR SERPLBLD CKD-EPI 2021: 81 ML/MIN/1.73M2
ERYTHROCYTE [DISTWIDTH] IN BLOOD BY AUTOMATED COUNT: 11.7 % (ref 11–15)
EST. AVERAGE GLUCOSE BLD GHB EST-MCNC: 108 MG/DL
EST. AVERAGE GLUCOSE BLD GHB EST-SCNC: 6 MMOL/L
GLUCOSE SERPL-MCNC: 88 MG/DL (ref 65–99)
HBA1C MFR BLD: 5.4 %
HCT VFR BLD AUTO: 42.9 % (ref 35–45)
HDLC SERPL-MCNC: 64 MG/DL
HGB BLD-MCNC: 14.3 G/DL (ref 11.7–15.5)
LDLC SERPL CALC-MCNC: 109 MG/DL (CALC)
MCH RBC QN AUTO: 32.2 PG (ref 27–33)
MCHC RBC AUTO-ENTMCNC: 33.3 G/DL (ref 32–36)
MCV RBC AUTO: 96.6 FL (ref 80–100)
NONHDLC SERPL-MCNC: 131 MG/DL (CALC)
PLATELET # BLD AUTO: 231 THOUSAND/UL (ref 140–400)
PMV BLD REES-ECKER: 10 FL (ref 7.5–12.5)
POTASSIUM SERPL-SCNC: 4.2 MMOL/L (ref 3.5–5.3)
PROT SERPL-MCNC: 6.5 G/DL (ref 6.1–8.1)
RBC # BLD AUTO: 4.44 MILLION/UL (ref 3.8–5.1)
SODIUM SERPL-SCNC: 141 MMOL/L (ref 135–146)
TRIGL SERPL-MCNC: 111 MG/DL
TSH SERPL-ACNC: 1.42 MIU/L (ref 0.4–4.5)
WBC # BLD AUTO: 6.1 THOUSAND/UL (ref 3.8–10.8)

## 2025-08-31 DIAGNOSIS — F41.1 GENERALIZED ANXIETY DISORDER: ICD-10-CM

## 2025-09-01 RX ORDER — DESVENLAFAXINE 50 MG/1
50 TABLET, FILM COATED, EXTENDED RELEASE ORAL DAILY
Qty: 90 TABLET | Refills: 3 | Status: SHIPPED | OUTPATIENT
Start: 2025-09-01

## 2025-09-03 ENCOUNTER — APPOINTMENT (OUTPATIENT)
Dept: NEUROLOGY | Facility: CLINIC | Age: 65
End: 2025-09-03
Payer: MEDICARE

## 2025-09-03 ASSESSMENT — PATIENT HEALTH QUESTIONNAIRE - PHQ9
1. LITTLE INTEREST OR PLEASURE IN DOING THINGS: NOT AT ALL
SUM OF ALL RESPONSES TO PHQ9 QUESTIONS 1 & 2: 0
2. FEELING DOWN, DEPRESSED OR HOPELESS: NOT AT ALL

## 2025-09-08 ENCOUNTER — APPOINTMENT (OUTPATIENT)
Dept: GASTROENTEROLOGY | Facility: HOSPITAL | Age: 65
End: 2025-09-08
Payer: MEDICARE

## 2025-09-11 ENCOUNTER — APPOINTMENT (OUTPATIENT)
Dept: PRIMARY CARE | Facility: CLINIC | Age: 65
End: 2025-09-11
Payer: MEDICARE